# Patient Record
Sex: FEMALE | Race: WHITE | NOT HISPANIC OR LATINO | Employment: PART TIME | URBAN - METROPOLITAN AREA
[De-identification: names, ages, dates, MRNs, and addresses within clinical notes are randomized per-mention and may not be internally consistent; named-entity substitution may affect disease eponyms.]

---

## 2020-10-14 ENCOUNTER — HOSPITAL ENCOUNTER (OUTPATIENT)
Dept: RADIOLOGY | Facility: HOSPITAL | Age: 35
Discharge: HOME | End: 2020-10-14
Attending: FAMILY MEDICINE
Payer: COMMERCIAL

## 2020-10-14 ENCOUNTER — OFFICE VISIT (OUTPATIENT)
Dept: FAMILY MEDICINE | Facility: CLINIC | Age: 35
End: 2020-10-14
Payer: COMMERCIAL

## 2020-10-14 VITALS
HEART RATE: 71 BPM | HEIGHT: 61 IN | DIASTOLIC BLOOD PRESSURE: 83 MMHG | SYSTOLIC BLOOD PRESSURE: 120 MMHG | TEMPERATURE: 98 F | BODY MASS INDEX: 29.98 KG/M2 | WEIGHT: 158.81 LBS

## 2020-10-14 DIAGNOSIS — M25.562 CHRONIC PAIN OF LEFT KNEE: ICD-10-CM

## 2020-10-14 DIAGNOSIS — Z11.59 ENCOUNTER FOR SCREENING FOR OTHER VIRAL DISEASES: ICD-10-CM

## 2020-10-14 DIAGNOSIS — K21.00 GASTROESOPHAGEAL REFLUX DISEASE WITH ESOPHAGITIS WITHOUT HEMORRHAGE: ICD-10-CM

## 2020-10-14 DIAGNOSIS — G89.29 CHRONIC PAIN OF LEFT KNEE: ICD-10-CM

## 2020-10-14 DIAGNOSIS — Z13.6 ENCOUNTER FOR LIPID SCREENING FOR CARDIOVASCULAR DISEASE: ICD-10-CM

## 2020-10-14 DIAGNOSIS — T78.40XD ALLERGY, SUBSEQUENT ENCOUNTER: ICD-10-CM

## 2020-10-14 DIAGNOSIS — M79.605 LEFT LEG PAIN: ICD-10-CM

## 2020-10-14 DIAGNOSIS — Z82.61 FAMILY HISTORY OF RHEUMATOID ARTHRITIS: ICD-10-CM

## 2020-10-14 DIAGNOSIS — R05.3 CHRONIC COUGH: ICD-10-CM

## 2020-10-14 DIAGNOSIS — Z13.220 ENCOUNTER FOR LIPID SCREENING FOR CARDIOVASCULAR DISEASE: ICD-10-CM

## 2020-10-14 DIAGNOSIS — Z00.00 WELL ADULT EXAM: Primary | ICD-10-CM

## 2020-10-14 DIAGNOSIS — Z83.79 FAMILY HISTORY OF CELIAC DISEASE: ICD-10-CM

## 2020-10-14 DIAGNOSIS — Z76.89 REFERRAL OF PATIENT: ICD-10-CM

## 2020-10-14 DIAGNOSIS — Z79.899 ENCOUNTER FOR LONG-TERM (CURRENT) USE OF MEDICATIONS: ICD-10-CM

## 2020-10-14 DIAGNOSIS — M25.50 MULTIPLE JOINT PAIN: ICD-10-CM

## 2020-10-14 PROBLEM — T78.40XA ALLERGIES: Status: ACTIVE | Noted: 2020-10-14

## 2020-10-14 PROCEDURE — 99999 PR PBB SHADOW E&M-NEW PATIENT-LVL IV: CPT | Mod: PBBFAC,,, | Performed by: FAMILY MEDICINE

## 2020-10-14 PROCEDURE — 73590 X-RAY EXAM OF LOWER LEG: CPT | Mod: 26,LT,, | Performed by: RADIOLOGY

## 2020-10-14 PROCEDURE — 99213 OFFICE O/P EST LOW 20 MIN: CPT | Mod: 25,S$GLB,, | Performed by: FAMILY MEDICINE

## 2020-10-14 PROCEDURE — 3008F PR BODY MASS INDEX (BMI) DOCUMENTED: ICD-10-PCS | Mod: CPTII,S$GLB,, | Performed by: FAMILY MEDICINE

## 2020-10-14 PROCEDURE — 99213 PR OFFICE/OUTPT VISIT, EST, LEVL III, 20-29 MIN: ICD-10-PCS | Mod: 25,S$GLB,, | Performed by: FAMILY MEDICINE

## 2020-10-14 PROCEDURE — 3008F BODY MASS INDEX DOCD: CPT | Mod: CPTII,S$GLB,, | Performed by: FAMILY MEDICINE

## 2020-10-14 PROCEDURE — 73590 XR TIBIA FIBULA 2 VIEW LEFT: ICD-10-PCS | Mod: 26,LT,, | Performed by: RADIOLOGY

## 2020-10-14 PROCEDURE — 99385 PREV VISIT NEW AGE 18-39: CPT | Mod: S$GLB,,, | Performed by: FAMILY MEDICINE

## 2020-10-14 PROCEDURE — 73562 X-RAY EXAM OF KNEE 3: CPT | Mod: 26,LT,, | Performed by: RADIOLOGY

## 2020-10-14 PROCEDURE — 73560 X-RAY EXAM OF KNEE 1 OR 2: CPT | Mod: 26,59,RT, | Performed by: RADIOLOGY

## 2020-10-14 PROCEDURE — 73562 XR KNEE ORTHO LEFT: ICD-10-PCS | Mod: 26,LT,, | Performed by: RADIOLOGY

## 2020-10-14 PROCEDURE — 73560 XR KNEE ORTHO LEFT: ICD-10-PCS | Mod: 26,59,RT, | Performed by: RADIOLOGY

## 2020-10-14 PROCEDURE — 73560 X-RAY EXAM OF KNEE 1 OR 2: CPT | Mod: TC,PO,59,RT

## 2020-10-14 PROCEDURE — 73590 X-RAY EXAM OF LOWER LEG: CPT | Mod: TC,PO,LT

## 2020-10-14 PROCEDURE — 99385 PR PREVENTIVE VISIT,NEW,18-39: ICD-10-PCS | Mod: S$GLB,,, | Performed by: FAMILY MEDICINE

## 2020-10-14 PROCEDURE — 99999 PR PBB SHADOW E&M-NEW PATIENT-LVL IV: ICD-10-PCS | Mod: PBBFAC,,, | Performed by: FAMILY MEDICINE

## 2020-10-14 PROCEDURE — 73562 X-RAY EXAM OF KNEE 3: CPT | Mod: TC,PO,LT

## 2020-10-14 RX ORDER — MONTELUKAST SODIUM 10 MG/1
10 TABLET ORAL NIGHTLY
Qty: 30 TABLET | Refills: 11 | Status: SHIPPED | OUTPATIENT
Start: 2020-10-14 | End: 2020-11-13

## 2020-10-14 RX ORDER — ESOMEPRAZOLE MAGNESIUM 40 MG/1
40 CAPSULE, DELAYED RELEASE ORAL
Qty: 30 CAPSULE | Refills: 11 | Status: SHIPPED | OUTPATIENT
Start: 2020-10-14 | End: 2021-11-11

## 2020-10-14 NOTE — PROGRESS NOTES
======================================================  GOAL of current visit: ESTABLISH CARE    Previous PCP: none in 3 years  List all Specialists: none  Date of Last Annual Wellness: 3 years ago  Colonoscopy History: N/A  ======================================================  PLAN:      Problem List Items Addressed This Visit     Chronic pain of left knee     Reviewed x-ray.  Mild narrowing on x-rays seen.  Patient advised to try anti-inflammatory medication.  Follow-up if no improvement.  Knee brace as needed.         Relevant Orders    BRAYDON Screen w/Reflex    Sedimentation rate    C-reactive protein    Rheumatoid Factor    X-Ray Tibia Fibula 2 View Left (Completed)    Left leg pain     X-rays reviewed.  No abnormality seen on x-ray.  Patient does have soft tissue swelling in that area.  Will consider ultrasound if still causing issues.         Relevant Orders    BRAYDON Screen w/Reflex    Sedimentation rate    C-reactive protein    Rheumatoid Factor    X-ray Knee Ortho Left (Completed)    X-Ray Tibia Fibula 2 View Left (Completed)    Family history of rheumatoid arthritis     Check labs         Relevant Orders    BRAYDON Screen w/Reflex    Sedimentation rate    C-reactive protein    Rheumatoid Factor    Multiple joint pain    Relevant Orders    BRAYDON Screen w/Reflex    Sedimentation rate    C-reactive protein    Rheumatoid Factor    Chronic cough     Add Singulair.  Patient has been on antihistamines without relief.  Also treating GERD.  See problem.    Check COVID antibodies.         Relevant Medications    esomeprazole (NEXIUM) 40 MG capsule    Allergies     Continue antihistamines.  At Singulair.  Avoid triggers.         Relevant Medications    montelukast (SINGULAIR) 10 mg tablet    Family history of celiac disease     Check labs.         Relevant Orders    Tissue Transglutaminase, IgA    Gastroesophageal reflux disease with esophagitis without hemorrhage     Increase PPI to prescription strength.GERD  RECOMMENDATIONS  caffeine reduction dietary changes elevate HOB NPO after supper  - Take PPI in the morning 30-60 minutes before breakfast  - Educated patient on lifestyle modifications to help control/reduce reflux/abdominal pain including: avoid large meals, avoid eating within 2-3 hours of bedtime (avoid late night eating & lying down soon after eating), elevate head of bed if nocturnal symptoms are present, smoking cessation (if current smoker), & weight loss (if overweight).   - Educated to avoid known foods which trigger reflux symptoms & to minimize/avoid high-fat foods, chocolate, caffeine, citrus, alcohol, & tomato products.  - Advised to avoid/limit use of NSAID's, since they can cause GI upset, bleeding, and/or ulcers. If needed, take with food.            Relevant Medications    esomeprazole (NEXIUM) 40 MG capsule      Other Visit Diagnoses     Well adult exam    -  Primary    Relevant Orders    Lipid Panel    Hemoglobin A1C    Comprehensive Metabolic Panel    TSH    Encounter for long-term (current) use of medications        Relevant Orders    Lipid Panel    Hemoglobin A1C    Comprehensive Metabolic Panel    TSH    CBC Without Differential    Encounter for lipid screening for cardiovascular disease        Relevant Orders    Lipid Panel    Referral of patient        Relevant Orders    Ambulatory referral/consult to Obstetrics / Gynecology    Encounter for screening for other viral diseases        Relevant Orders    COVID-19 (SARS CoV-2) IgG Antibody        Future Appointments     Date Provider Specialty Appt Notes    11/25/2020 Alexis Guadalupe MD Family Medicine     10/14/2021 Alexis Guadalupe MD Family Medicine Annual Wellness           Medication List with Changes/Refills   New Medications    ESOMEPRAZOLE (NEXIUM) 40 MG CAPSULE    Take 1 capsule (40 mg total) by mouth before breakfast.    MONTELUKAST (SINGULAIR) 10 MG TABLET    Take 1 tablet (10 mg total) by mouth every evening.   Discontinued  Medications    ESOMEPRAZOLE MAGNESIUM (NEXIUM 24HR ORAL)    Take 1 tablet by mouth once daily.        Alexis Guadalupe M.D.     ==========================================================================  Subjective:      Patient ID: Sumi Niño is a 35 y.o. female.  has a past medical history of GERD (gastroesophageal reflux disease) and IBS (irritable bowel syndrome).     Chief Complaint: Annual Exam      Problem List Items Addressed This Visit     Chronic pain of left knee    Overview     Chronic.  Patient has had history of traumatic falls.  Patient is not been evaluated with x-ray or by medical facility previously.  No recent trauma or fall.  Patient reports that the left knee will occasionally lock and call sharp pain.  X-Ray Tibia Fibula 2 View Left  Narrative: EXAMINATION:  XR TIBIA FIBULA 2 VIEW LEFT    CLINICAL HISTORY:  Pain in left knee    TECHNIQUE:  AP and lateral views of the left tibia and fibula were performed.    COMPARISON:  None.    FINDINGS:  No acute osseous abnormality.  Soft tissues appear normal.  Impression: As above    Electronically signed by: Kamlesh Christie MD  Date:    10/14/2020  Time:    12:44  X-ray Knee Ortho Left  Narrative: EXAMINATION:  XR KNEE ORTHO LEFT    CLINICAL HISTORY:  Pain in left leg    TECHNIQUE:  AP standing of both knees, Merchant views of both knees as well as a lateral view of the left knee were performed.    COMPARISON:  None    FINDINGS:  No effusion.  No patellar tilt.  No fracture or dislocation.  Minimal medial compartment narrowing on either side.  Impression: As above    Electronically signed by: Kamlesh Christie MD  Date:    10/14/2020  Time:    12:44             Current Assessment & Plan     Reviewed x-ray.  Mild narrowing on x-rays seen.  Patient advised to try anti-inflammatory medication.  Follow-up if no improvement.  Knee brace as needed.         Left leg pain    Overview     Chronic.  Patient reports that she had a fall where she landed on a piece of  furniture and hit her tibia on in table.  Patient did have a open wound at that time with this was over two years ago.  She is still having pain and swelling in that area.         Current Assessment & Plan     X-rays reviewed.  No abnormality seen on x-ray.  Patient does have soft tissue swelling in that area.  Will consider ultrasound if still causing issues.         Family history of rheumatoid arthritis    Overview     Strong family history of autoimmune         Current Assessment & Plan     Check labs         Multiple joint pain    Chronic cough    Overview     Started earlier this year after viral illness.  Patient wonders if it was COVID.  Patient also has uncontrolled reflux on over-the-counter Nexium.         Current Assessment & Plan     Add Singulair.  Patient has been on antihistamines without relief.  Also treating GERD.  See problem.    Check COVID antibodies.         Allergies    Overview     Chronic.  Intermittent control.  Patient has tried antihistamines without relief.  Patient reports allergy symptoms with change in weather and seasons.         Current Assessment & Plan     Continue antihistamines.  At Singulair.  Avoid triggers.         Family history of celiac disease    Overview     Patient reports history of irritable bowel syndrome in herself.  Her mother has celiac disease.  She has never had a colonoscopy.         Current Assessment & Plan     Check labs.         Gastroesophageal reflux disease with esophagitis without hemorrhage    Overview     Chronic.  Uncontrolled.  Patient reports that she has a worsening cough when she lies flat.  She is on Nexium 20 mg without relief.         Current Assessment & Plan     Increase PPI to prescription strength.GERD RECOMMENDATIONS  caffeine reduction dietary changes elevate HOB NPO after supper  - Take PPI in the morning 30-60 minutes before breakfast  - Educated patient on lifestyle modifications to help control/reduce reflux/abdominal pain  including: avoid large meals, avoid eating within 2-3 hours of bedtime (avoid late night eating & lying down soon after eating), elevate head of bed if nocturnal symptoms are present, smoking cessation (if current smoker), & weight loss (if overweight).   - Educated to avoid known foods which trigger reflux symptoms & to minimize/avoid high-fat foods, chocolate, caffeine, citrus, alcohol, & tomato products.  - Advised to avoid/limit use of NSAID's, since they can cause GI upset, bleeding, and/or ulcers. If needed, take with food.              Other Visit Diagnoses     Well adult exam    -  Primary    Encounter for long-term (current) use of medications        Encounter for lipid screening for cardiovascular disease        Referral of patient        Encounter for screening for other viral diseases               Past Medical History:  Past Medical History:   Diagnosis Date    GERD (gastroesophageal reflux disease)     IBS (irritable bowel syndrome)      History reviewed. No pertinent surgical history.  Review of patient's allergies indicates:   Allergen Reactions    Amoxicillin Hives, Itching, Other (See Comments), Rash and Swelling    Cephalexin Hives, Itching, Other (See Comments), Rash and Swelling    Sulfamethoxazole-trimethoprim Hives, Itching, Other (See Comments), Rash and Swelling    Codeine Hallucinations, Hives, Itching and Nausea And Vomiting     Medication List with Changes/Refills   New Medications    ESOMEPRAZOLE (NEXIUM) 40 MG CAPSULE    Take 1 capsule (40 mg total) by mouth before breakfast.    MONTELUKAST (SINGULAIR) 10 MG TABLET    Take 1 tablet (10 mg total) by mouth every evening.   Discontinued Medications    ESOMEPRAZOLE MAGNESIUM (NEXIUM 24HR ORAL)    Take 1 tablet by mouth once daily.       Social History     Tobacco Use    Smoking status: Never Smoker    Smokeless tobacco: Never Used   Substance Use Topics    Alcohol use: Yes     Alcohol/week: 3.0 standard drinks     Types: 3 Glasses  "of wine per week      Family History   Problem Relation Age of Onset    Arthritis Mother     COPD Mother     Cancer Maternal Grandfather         Melanoma and lung    Diabetes Paternal Aunt     Heart disease Father          from heart attack age 42    Hyperlipidemia Father     Hyperlipidemia Brother     Learning disabilities Brother         ADHD       I have reviewed the complete PMH, social history, surgical history, allergies and medications.  As well as family history.    Review of Systems   Constitutional: Negative for activity change and fatigue.   HENT: Negative for congestion and sinus pain.    Eyes: Negative for visual disturbance.   Respiratory: Negative for chest tightness and shortness of breath.    Cardiovascular: Negative for palpitations and leg swelling.   Gastrointestinal: Negative for abdominal pain, diarrhea and nausea.   Endocrine: Negative for polyuria.   Genitourinary: Negative for difficulty urinating and frequency.   Musculoskeletal: Positive for arthralgias and joint swelling.   Skin: Negative for rash.   Neurological: Positive for dizziness. Negative for headaches.   Psychiatric/Behavioral: Negative for agitation. The patient is not nervous/anxious.      Objective:   /83   Pulse 71   Temp 98.1 °F (36.7 °C) (Temporal)   Ht 5' 1" (1.549 m)   Wt 72 kg (158 lb 12.8 oz)   LMP 2020   BMI 30.00 kg/m²   Physical Exam  Vitals signs and nursing note reviewed.   Constitutional:       General: She is not in acute distress.     Appearance: She is well-developed.   HENT:      Head: Normocephalic and atraumatic.   Eyes:      Pupils: Pupils are equal, round, and reactive to light.   Neck:      Musculoskeletal: Normal range of motion and neck supple.   Cardiovascular:      Rate and Rhythm: Normal rate and regular rhythm.      Heart sounds: Normal heart sounds. No murmur.   Pulmonary:      Effort: Pulmonary effort is normal. No respiratory distress.      Breath sounds: Normal " breath sounds. No wheezing.   Musculoskeletal:      Left knee: She exhibits deformity and bony tenderness. She exhibits normal range of motion, no swelling, no effusion, no ecchymosis, no laceration and no erythema. Tenderness found. Medial joint line tenderness noted.        Legs:       Comments: Soft tissue swelling and tenderness to palpation.  No bruising, no change in skin color.  Fluctuant but not indurated.   Skin:     General: Skin is warm and dry.      Capillary Refill: Capillary refill takes less than 2 seconds.   Neurological:      Mental Status: She is alert and oriented to person, place, and time.      Cranial Nerves: No cranial nerve deficit.   Psychiatric:         Behavior: Behavior normal.         Assessment:     1. Well adult exam    2. Encounter for long-term (current) use of medications    3. Encounter for lipid screening for cardiovascular disease    4. Chronic pain of left knee    5. Left leg pain    6. Family history of rheumatoid arthritis    7. Multiple joint pain    8. Referral of patient    9. Chronic cough    10. Encounter for screening for other viral diseases    11. Allergy, subsequent encounter    12. Gastroesophageal reflux disease with esophagitis without hemorrhage    13. Family history of celiac disease      MDM:   Moderate complexity.  Moderate risk.  Patient also here for annual wellness visit.  See other note for annual wellness.  I have Reviewed and summarized old records.  I have performed thorough medication reconciliation today and discussed risk and benefits of each medication.  I have reviewed imaging, labs and discussed with patient.  All questions were answered.  I am requesting old records and will review them once they are available.  Previous provider    I have signed for the following orders AND/OR meds.  Orders Placed This Encounter   Procedures    X-ray Knee Ortho Left     Standing Status:   Future     Number of Occurrences:   1     Standing Expiration Date:    10/14/2021    X-Ray Tibia Fibula 2 View Left     Standing Status:   Future     Number of Occurrences:   1     Standing Expiration Date:   10/14/2021     Order Specific Question:   May the Radiologist modify the order per protocol to meet the clinical needs of the patient?     Answer:   Yes    Lipid Panel     Standing Status:   Standing     Number of Occurrences:   99     Standing Expiration Date:   12/14/2021    Hemoglobin A1C     Standing Status:   Standing     Number of Occurrences:   99     Standing Expiration Date:   12/14/2021    Comprehensive Metabolic Panel     Standing Status:   Standing     Number of Occurrences:   99     Standing Expiration Date:   12/13/2021    TSH     Standing Status:   Standing     Number of Occurrences:   99     Standing Expiration Date:   12/14/2021    CBC Without Differential     Standing Status:   Future     Standing Expiration Date:   12/13/2021    COVID-19 (SARS CoV-2) IgG Antibody     Standing Status:   Future     Standing Expiration Date:   12/13/2021     Order Specific Question:   Diagnosis:     Answer:   Encounter for antibody response examination [[id number]]    BRAYDON Screen w/Reflex     Standing Status:   Future     Standing Expiration Date:   12/13/2021    Sedimentation rate     Standing Status:   Future     Standing Expiration Date:   12/13/2021    C-reactive protein     Standing Status:   Future     Standing Expiration Date:   12/13/2021    Rheumatoid Factor     Standing Status:   Future     Standing Expiration Date:   12/13/2021    Tissue Transglutaminase, IgA     Standing Status:   Future     Standing Expiration Date:   10/14/2021    Ambulatory referral/consult to Obstetrics / Gynecology     Standing Status:   Future     Standing Expiration Date:   11/14/2021     Referral Priority:   Routine     Referral Type:   Consultation     Referral Reason:   Specialty Services Required     Referred to Provider:   Britney Fox MD     Requested Specialty:    Obstetrics and Gynecology     Number of Visits Requested:   1     Medications Ordered This Encounter   Medications    esomeprazole (NEXIUM) 40 MG capsule     Sig: Take 1 capsule (40 mg total) by mouth before breakfast.     Dispense:  30 capsule     Refill:  11    montelukast (SINGULAIR) 10 mg tablet     Sig: Take 1 tablet (10 mg total) by mouth every evening.     Dispense:  30 tablet     Refill:  11        Follow up in about 1 year (around 10/14/2021), or if symptoms worsen or fail to improve, for Annual Wellness Exam.    If no improvement in symptoms or symptoms worsen, advised to call/follow-up at clinic or go to ER. Patient voiced understanding and all questions/concerns were addressed.     DISCLAIMER: This note was compiled by using a speech recognition dictation system and therefore please be aware that typographical / speech recognition errors can and do occur.  Please contact me if you see any errors specifically.    Alexis Guadalupe M.D.       Office: [phone]   [address] [place] [address]  FAX: [phone]

## 2020-10-14 NOTE — ASSESSMENT & PLAN NOTE
Add Singulair.  Patient has been on antihistamines without relief.  Also treating GERD.  See problem.    Check COVID antibodies.

## 2020-10-14 NOTE — PROGRESS NOTES
Please CALL patient with results and Document verification.   [phone]  Minimal joint space narrowing.  Otherwise normal knee x-ray.

## 2020-10-14 NOTE — PROGRESS NOTES
This note is specifically for wellness visit performed today.     WELLNESS EXAM      Patient ID: Sumi Niño is a 35 y.o. female with  has a past medical history of GERD (gastroesophageal reflux disease) and IBS (irritable bowel syndrome).     Chief Complaint:  Encounter for wellness exam    Well Adult Physical: Patient here for a comprehensive physical exam.The patient reports chronic problems.  See other note for details.  Do you take any herbs or supplements that were not prescribed by a doctor? no   Are you taking calcium supplements? no   Are you taking aspirin daily? no     History:  None reported  OBGYN:  Requesting record    LMP: Patient's last menstrual period was 09/30/2020.   MMG:  none  PAP: due  Colonoscopy: none    The patient has no Health Maintenance topics of status Not Due     ==============================================  History reviewed.   Health Maintenance Due   Topic Date Due    Lipid Panel  1984    TETANUS VACCINE  07/29/2002    Cervical Cancer Screening  07/29/2005       Past Medical History:  Past Medical History:   Diagnosis Date    GERD (gastroesophageal reflux disease)     IBS (irritable bowel syndrome)      History reviewed. No pertinent surgical history.  Review of patient's allergies indicates:   Allergen Reactions    Amoxicillin Hives, Itching, Other (See Comments), Rash and Swelling    Cephalexin Hives, Itching, Other (See Comments), Rash and Swelling    Sulfamethoxazole-trimethoprim Hives, Itching, Other (See Comments), Rash and Swelling    Codeine Hallucinations, Hives, Itching and Nausea And Vomiting     Current Outpatient Medications on File Prior to Visit   Medication Sig Dispense Refill    [DISCONTINUED] esomeprazole magnesium (NEXIUM 24HR ORAL) Take 1 tablet by mouth once daily.        No current facility-administered medications on file prior to visit.      Social History     Socioeconomic History    Marital status: Single     Spouse name: Not on file     Number of children: Not on file    Years of education: Not on file    Highest education level: Not on file   Occupational History    Not on file   Social Needs    Financial resource strain: Not on file    Food insecurity     Worry: Not on file     Inability: Not on file    Transportation needs     Medical: Not on file     Non-medical: Not on file   Tobacco Use    Smoking status: Never Smoker    Smokeless tobacco: Never Used   Substance and Sexual Activity    Alcohol use: Yes     Alcohol/week: 3.0 standard drinks     Types: 3 Glasses of wine per week    Drug use: Never    Sexual activity: Yes     Partners: Male     Birth control/protection: Coitus interruptus, Partner-Vasectomy   Lifestyle    Physical activity     Days per week: Not on file     Minutes per session: Not on file    Stress: Only a little   Relationships    Social connections     Talks on phone: Not on file     Gets together: Not on file     Attends [employer] service: Not on file     Active member of club or organization: Not on file     Attends meetings of clubs or organizations: Not on file     Relationship status: Not on file   Other Topics Concern    Not on file   Social History Narrative    Not on file     Family History   Problem Relation Age of Onset    Arthritis Mother     COPD Mother     Cancer Maternal Grandfather         Melanoma and lung    Diabetes Paternal Aunt     Heart disease Father          from heart attack age 42    Hyperlipidemia Father     Hyperlipidemia Brother     Learning disabilities Brother         ADHD       Review of Systems   See other note for full review of systems.  Otherwise negative.  Objective:    Nursing note and vitals reviewed.  Vitals:    10/14/20 1125   BP: 120/83   Pulse: 71   Temp: 98.1 °F (36.7 °C)     Body mass index is 30 kg/m².     Physical Exam  Vitals signs and nursing note reviewed.   Constitutional:       General: She is not in acute distress.     Appearance: She is  well-developed.   HENT:      Head: Normocephalic and atraumatic.   Eyes:      Pupils: Pupils are equal, round, and reactive to light.   Neck:      Musculoskeletal: Normal range of motion and neck supple.   Cardiovascular:      Rate and Rhythm: Normal rate and regular rhythm.      Heart sounds: Normal heart sounds. No murmur.   Pulmonary:      Effort: Pulmonary effort is normal. No respiratory distress.      Breath sounds: Normal breath sounds. No wheezing.   Musculoskeletal: Normal range of motion.   Skin:     General: Skin is warm and dry.      Capillary Refill: Capillary refill takes less than 2 seconds.   Neurological:      Mental Status: She is alert and oriented to person, place, and time.      Cranial Nerves: No cranial nerve deficit.   Psychiatric:         Behavior: Behavior normal.      See other note for abnormalities.            Assessment / Plan:      1.  ANNUAL WELLNESS EXAM -patient here for annual wellness exam.  Labs ordered.  Health maintenance was reviewed and ordered.  Medications were reviewed and reconciled.    Complete history and physical was completed today.  Complete and thorough medication reconciliation was performed.  Discussed risks and benefits of medications.  Advised patient on orders and health maintenance.  We discussed old records and old labs if available.  Will request any records not available through epic.  Continue current medications listed on your summary sheet.    All questions were answered. Patient had no further concerns. Advised of Wellness plan. Follow up in 1 year for ANNUAL WELLNESS EXAM    Orders Placed This Encounter   Procedures    X-ray Knee Ortho Left     Standing Status:   Future     Number of Occurrences:   1     Standing Expiration Date:   10/14/2021    X-Ray Tibia Fibula 2 View Left     Standing Status:   Future     Number of Occurrences:   1     Standing Expiration Date:   10/14/2021     Order Specific Question:   May the Radiologist modify the order per  protocol to meet the clinical needs of the patient?     Answer:   Yes    Lipid Panel     Standing Status:   Standing     Number of Occurrences:   99     Standing Expiration Date:   12/14/2021    Hemoglobin A1C     Standing Status:   Standing     Number of Occurrences:   99     Standing Expiration Date:   12/14/2021    Comprehensive Metabolic Panel     Standing Status:   Standing     Number of Occurrences:   99     Standing Expiration Date:   12/13/2021    TSH     Standing Status:   Standing     Number of Occurrences:   99     Standing Expiration Date:   12/14/2021    CBC Without Differential     Standing Status:   Future     Standing Expiration Date:   12/13/2021    COVID-19 (SARS CoV-2) IgG Antibody     Standing Status:   Future     Standing Expiration Date:   12/13/2021     Order Specific Question:   Diagnosis:     Answer:   Encounter for antibody response examination [[id number]]    BRAYDON Screen w/Reflex     Standing Status:   Future     Standing Expiration Date:   12/13/2021    Sedimentation rate     Standing Status:   Future     Standing Expiration Date:   12/13/2021    C-reactive protein     Standing Status:   Future     Standing Expiration Date:   12/13/2021    Rheumatoid Factor     Standing Status:   Future     Standing Expiration Date:   12/13/2021    Tissue Transglutaminase, IgA     Standing Status:   Future     Standing Expiration Date:   10/14/2021    Ambulatory referral/consult to Obstetrics / Gynecology     Standing Status:   Future     Standing Expiration Date:   11/14/2021     Referral Priority:   Routine     Referral Type:   Consultation     Referral Reason:   Specialty Services Required     Referred to Provider:   Britney Fox MD     Requested Specialty:   Obstetrics and Gynecology     Number of Visits Requested:   1       Medications Ordered This Encounter   Medications    esomeprazole (NEXIUM) 40 MG capsule     Sig: Take 1 capsule (40 mg total) by mouth before breakfast.      Dispense:  30 capsule     Refill:  11    montelukast (SINGULAIR) 10 mg tablet     Sig: Take 1 tablet (10 mg total) by mouth every evening.     Dispense:  30 tablet     Refill:  11          Alexis Guadalupe MD

## 2020-10-14 NOTE — ASSESSMENT & PLAN NOTE
X-rays reviewed.  No abnormality seen on x-ray.  Patient does have soft tissue swelling in that area.  Will consider ultrasound if still causing issues.

## 2020-10-14 NOTE — PATIENT INSTRUCTIONS
Follow up in about 1 year (around 10/14/2021), or if symptoms worsen or fail to improve, for Annual Wellness Exam.     If no improvement in symptoms or symptoms worsen, please be advised to call MD, follow-up at clinic and/or go to ER if becomes severe.    Alexis Guadalupe M.D.        We Offer TELEHEALTH & Same Day Appointments!   Book your Telehealth appointment with me through my nurse or   Clinic appointments on [employer]!    [address] [place] [address]    Office: [phone]   FAX: [phone]    Check out my Facebook Page and Follow Me at: [email or web address]    Check out my website at [employer] by clicking on: [email or web address]    To Schedule appointments online, go to [email or web address]: [email or web address]

## 2020-10-14 NOTE — ASSESSMENT & PLAN NOTE
Increase PPI to prescription strength.GERD RECOMMENDATIONS  caffeine reduction dietary changes elevate HOB NPO after supper  - Take PPI in the morning 30-60 minutes before breakfast  - Educated patient on lifestyle modifications to help control/reduce reflux/abdominal pain including: avoid large meals, avoid eating within 2-3 hours of bedtime (avoid late night eating & lying down soon after eating), elevate head of bed if nocturnal symptoms are present, smoking cessation (if current smoker), & weight loss (if overweight).   - Educated to avoid known foods which trigger reflux symptoms & to minimize/avoid high-fat foods, chocolate, caffeine, citrus, alcohol, & tomato products.  - Advised to avoid/limit use of NSAID's, since they can cause GI upset, bleeding, and/or ulcers. If needed, take with food.

## 2020-10-14 NOTE — PROGRESS NOTES
Please CALL patient with results and Document verification.   [phone]    Normal x-ray of the left lower leg.

## 2020-10-14 NOTE — ASSESSMENT & PLAN NOTE
Reviewed x-ray.  Mild narrowing on x-rays seen.  Patient advised to try anti-inflammatory medication.  Follow-up if no improvement.  Knee brace as needed.

## 2020-11-20 ENCOUNTER — LAB VISIT (OUTPATIENT)
Dept: LAB | Facility: HOSPITAL | Age: 35
End: 2020-11-20
Attending: FAMILY MEDICINE
Payer: COMMERCIAL

## 2020-11-20 DIAGNOSIS — Z83.79 FAMILY HISTORY OF CELIAC DISEASE: ICD-10-CM

## 2020-11-20 DIAGNOSIS — M25.50 MULTIPLE JOINT PAIN: ICD-10-CM

## 2020-11-20 DIAGNOSIS — G89.29 CHRONIC PAIN OF LEFT KNEE: ICD-10-CM

## 2020-11-20 DIAGNOSIS — M25.562 CHRONIC PAIN OF LEFT KNEE: ICD-10-CM

## 2020-11-20 DIAGNOSIS — M79.605 LEFT LEG PAIN: ICD-10-CM

## 2020-11-20 DIAGNOSIS — Z11.59 ENCOUNTER FOR SCREENING FOR OTHER VIRAL DISEASES: ICD-10-CM

## 2020-11-20 DIAGNOSIS — Z82.61 FAMILY HISTORY OF RHEUMATOID ARTHRITIS: ICD-10-CM

## 2020-11-20 DIAGNOSIS — Z00.00 WELL ADULT EXAM: ICD-10-CM

## 2020-11-20 DIAGNOSIS — Z79.899 ENCOUNTER FOR LONG-TERM (CURRENT) USE OF MEDICATIONS: ICD-10-CM

## 2020-11-20 DIAGNOSIS — Z13.6 ENCOUNTER FOR LIPID SCREENING FOR CARDIOVASCULAR DISEASE: ICD-10-CM

## 2020-11-20 DIAGNOSIS — Z13.220 ENCOUNTER FOR LIPID SCREENING FOR CARDIOVASCULAR DISEASE: ICD-10-CM

## 2020-11-20 LAB
ERYTHROCYTE [DISTWIDTH] IN BLOOD BY AUTOMATED COUNT: 11.9 % (ref 11.5–14.5)
ERYTHROCYTE [SEDIMENTATION RATE] IN BLOOD BY WESTERGREN METHOD: 4 MM/HR (ref 0–20)
HCT VFR BLD AUTO: 44.5 % (ref 37–48.5)
HGB BLD-MCNC: 13.8 G/DL (ref 12–16)
MCH RBC QN AUTO: 32.7 PG (ref 27–31)
MCHC RBC AUTO-ENTMCNC: 31 G/DL (ref 32–36)
MCV RBC AUTO: 106 FL (ref 82–98)
PLATELET # BLD AUTO: 250 K/UL (ref 150–350)
PMV BLD AUTO: 11.4 FL (ref 9.2–12.9)
RBC # BLD AUTO: 4.22 M/UL (ref 4–5.4)
TSH SERPL DL<=0.005 MIU/L-ACNC: 2.25 UIU/ML (ref 0.4–4)
WBC # BLD AUTO: 9.32 K/UL (ref 3.9–12.7)

## 2020-11-20 PROCEDURE — 36415 COLL VENOUS BLD VENIPUNCTURE: CPT | Mod: PO

## 2020-11-20 PROCEDURE — 86038 ANTINUCLEAR ANTIBODIES: CPT

## 2020-11-20 PROCEDURE — 86039 ANTINUCLEAR ANTIBODIES (ANA): CPT

## 2020-11-20 PROCEDURE — 83036 HEMOGLOBIN GLYCOSYLATED A1C: CPT

## 2020-11-20 PROCEDURE — 85027 COMPLETE CBC AUTOMATED: CPT

## 2020-11-20 PROCEDURE — 86140 C-REACTIVE PROTEIN: CPT

## 2020-11-20 PROCEDURE — 80061 LIPID PANEL: CPT

## 2020-11-20 PROCEDURE — 83516 IMMUNOASSAY NONANTIBODY: CPT

## 2020-11-20 PROCEDURE — 85651 RBC SED RATE NONAUTOMATED: CPT | Mod: PO

## 2020-11-20 PROCEDURE — 80053 COMPREHEN METABOLIC PANEL: CPT

## 2020-11-20 PROCEDURE — 86769 SARS-COV-2 COVID-19 ANTIBODY: CPT

## 2020-11-20 PROCEDURE — 84443 ASSAY THYROID STIM HORMONE: CPT

## 2020-11-20 PROCEDURE — 86431 RHEUMATOID FACTOR QUANT: CPT

## 2020-11-20 PROCEDURE — 86235 NUCLEAR ANTIGEN ANTIBODY: CPT | Mod: 59

## 2020-11-21 LAB
ALBUMIN SERPL BCP-MCNC: 4.3 G/DL (ref 3.5–5.2)
ALP SERPL-CCNC: 55 U/L (ref 55–135)
ALT SERPL W/O P-5'-P-CCNC: 54 U/L (ref 10–44)
ANION GAP SERPL CALC-SCNC: 17 MMOL/L (ref 8–16)
AST SERPL-CCNC: 40 U/L (ref 10–40)
BILIRUB SERPL-MCNC: 0.6 MG/DL (ref 0.1–1)
BUN SERPL-MCNC: 8 MG/DL (ref 6–20)
CALCIUM SERPL-MCNC: 9 MG/DL (ref 8.7–10.5)
CHLORIDE SERPL-SCNC: 101 MMOL/L (ref 95–110)
CHOLEST SERPL-MCNC: 324 MG/DL (ref 120–199)
CHOLEST/HDLC SERPL: 3.3 {RATIO} (ref 2–5)
CO2 SERPL-SCNC: 19 MMOL/L (ref 23–29)
CREAT SERPL-MCNC: 0.7 MG/DL (ref 0.5–1.4)
CRP SERPL-MCNC: 8.4 MG/L (ref 0–8.2)
EST. GFR  (AFRICAN AMERICAN): >60 ML/MIN/1.73 M^2
EST. GFR  (NON AFRICAN AMERICAN): >60 ML/MIN/1.73 M^2
ESTIMATED AVG GLUCOSE: 100 MG/DL (ref 68–131)
GLUCOSE SERPL-MCNC: 78 MG/DL (ref 70–110)
HBA1C MFR BLD HPLC: 5.1 % (ref 4–5.6)
HDLC SERPL-MCNC: 97 MG/DL (ref 40–75)
HDLC SERPL: 29.9 % (ref 20–50)
LDLC SERPL CALC-MCNC: 205.4 MG/DL (ref 63–159)
NONHDLC SERPL-MCNC: 227 MG/DL
POTASSIUM SERPL-SCNC: 3.4 MMOL/L (ref 3.5–5.1)
PROT SERPL-MCNC: 7.7 G/DL (ref 6–8.4)
SARS-COV-2 IGG SERPLBLD QL IA.RAPID: NEGATIVE
SODIUM SERPL-SCNC: 137 MMOL/L (ref 136–145)
TRIGL SERPL-MCNC: 108 MG/DL (ref 30–150)

## 2020-11-21 NOTE — PROGRESS NOTES
#CALL THE PATIENT# to discuss results/see if they have questions and document verification. Make FU appt if needed.    #Pend me the orders in my interpretation below.#    I have sent a message to them with the interpretation (see below).  See if they have any questions and make a follow-up appointment if not already schedule and if needed.    Also please address any outstanding health maintenance that may be due: TETANUS VACCINE due on 07/29/2002  Cervical Cancer Screening due on 07/29/2005  ====================================    My interpretation that was sent to them through [employer]:    Sumi, I have reviewed your recent blood work.     COVID ANTIBODY TESTING IS NEGATIVE.  THEREFORE THERE HAS NOT BEEN AN IMMUNE RESPONSE TO COVID.  Your complete blood count is WITHIN NORMAL LIMITS.  NO ANEMIA.  MCV LEVEL IS ELEVATED WHICH COULD BE DUE TO B12 OR FOLATE DEFICIENCY.  PLEASE FOLLOW-UP SO WE CAN FURTHER EVALUATE.  Your metabolic panel which shows your glucose, kidney function, electrolytes, and liver function is ABNORMAL WITH MILD LOW-POTASSIUM.  ELEVATED LIVER ENZYME.  OTHERWISE NORMAL KIDNEY FUNCTION AND ELECTROLYTES.  Thyroid studies are NORMAL.   Your cholesterol is ABNORMAL.  I RECOMMEND LIFESTYLE MODIFICATION WITH LOW-FAT HIGH-FIBER DIET AND INCREASE IN EXERCISE.  RECHECK LIPID PANEL IN THREE MONTHS.  IF NO IMPROVEMENT WILL NEED TO START ON MEDICATION.  Your INFLAMMATORY MARKERS SHOWS MILD INFLAMMATION  Your hemoglobin A1c is NORMAL.  THEREFORE YOU DO NOT HAVE DIABETES.  This test is gold standard screening test for diabetes.  It is a measures 3 months of your average blood sugar.

## 2020-11-23 LAB
ANA PATTERN 1: NORMAL
ANA PATTERN 2: NORMAL
ANA SER QL IF: POSITIVE
ANA TITER 2: NORMAL
ANA TITR SER IF: NORMAL {TITER}
RHEUMATOID FACT SERPL-ACNC: <10 IU/ML (ref 0–15)

## 2020-11-24 DIAGNOSIS — R76.8 POSITIVE ANA (ANTINUCLEAR ANTIBODY): Primary | ICD-10-CM

## 2020-11-24 NOTE — PROGRESS NOTES
Please CALL patient with results and Document verification.   [phone]  BRAYDON screening test for autoimmune is positive.  Further testing is pending.    I am going to refer you to Rheumatology for further evaluation and treatment.    Set up referral.  Notify patient.

## 2020-11-25 ENCOUNTER — OFFICE VISIT (OUTPATIENT)
Dept: FAMILY MEDICINE | Facility: CLINIC | Age: 35
End: 2020-11-25
Payer: COMMERCIAL

## 2020-11-25 VITALS — WEIGHT: 155 LBS | BODY MASS INDEX: 29.29 KG/M2

## 2020-11-25 DIAGNOSIS — E78.5 HYPERLIPIDEMIA, UNSPECIFIED HYPERLIPIDEMIA TYPE: ICD-10-CM

## 2020-11-25 DIAGNOSIS — Z82.61 FAMILY HISTORY OF RHEUMATOID ARTHRITIS: ICD-10-CM

## 2020-11-25 DIAGNOSIS — R74.8 ELEVATED LIVER ENZYMES: ICD-10-CM

## 2020-11-25 DIAGNOSIS — M25.50 MULTIPLE JOINT PAIN: ICD-10-CM

## 2020-11-25 DIAGNOSIS — E56.9 VITAMIN DEFICIENCY: ICD-10-CM

## 2020-11-25 DIAGNOSIS — M79.605 LEFT LEG PAIN: ICD-10-CM

## 2020-11-25 DIAGNOSIS — M25.562 CHRONIC PAIN OF LEFT KNEE: ICD-10-CM

## 2020-11-25 DIAGNOSIS — E87.6 HYPOKALEMIA: ICD-10-CM

## 2020-11-25 DIAGNOSIS — R76.8 POSITIVE ANA (ANTINUCLEAR ANTIBODY): Primary | ICD-10-CM

## 2020-11-25 DIAGNOSIS — G89.29 CHRONIC PAIN OF LEFT KNEE: ICD-10-CM

## 2020-11-25 DIAGNOSIS — R71.8 ELEVATED MCV: ICD-10-CM

## 2020-11-25 LAB
ANTI SM ANTIBODY: 0.06 RATIO (ref 0–0.99)
ANTI SM/RNP ANTIBODY: 0.06 RATIO (ref 0–0.99)
ANTI-SM INTERPRETATION: NEGATIVE
ANTI-SM/RNP INTERPRETATION: NEGATIVE
ANTI-SSA ANTIBODY: 0.05 RATIO (ref 0–0.99)
ANTI-SSA INTERPRETATION: NEGATIVE
ANTI-SSB ANTIBODY: 0.06 RATIO (ref 0–0.99)
ANTI-SSB INTERPRETATION: NEGATIVE
DSDNA AB SER-ACNC: NORMAL [IU]/ML

## 2020-11-25 PROCEDURE — 99214 OFFICE O/P EST MOD 30 MIN: CPT | Mod: 95,,, | Performed by: FAMILY MEDICINE

## 2020-11-25 PROCEDURE — 99214 PR OFFICE/OUTPT VISIT, EST, LEVL IV, 30-39 MIN: ICD-10-PCS | Mod: 95,,, | Performed by: FAMILY MEDICINE

## 2020-11-25 PROCEDURE — 3008F BODY MASS INDEX DOCD: CPT | Mod: CPTII,,, | Performed by: FAMILY MEDICINE

## 2020-11-25 PROCEDURE — 3008F PR BODY MASS INDEX (BMI) DOCUMENTED: ICD-10-PCS | Mod: CPTII,,, | Performed by: FAMILY MEDICINE

## 2020-11-25 RX ORDER — MULTIVIT WITH IRON,MINERALS
100 TABLET ORAL NIGHTLY
Refills: 0 | COMMUNITY
Start: 2020-11-25 | End: 2021-11-25

## 2020-11-25 RX ORDER — MELOXICAM 7.5 MG/1
7.5 TABLET ORAL DAILY
Qty: 30 TABLET | Refills: 11 | Status: SHIPPED | OUTPATIENT
Start: 2020-11-25 | End: 2021-02-17

## 2020-11-25 RX ORDER — GLUCOSAM/CHONDRO/HERB 149/HYAL 750-100 MG
TABLET ORAL
COMMUNITY
Start: 2020-11-25 | End: 2020-11-27 | Stop reason: SDUPTHER

## 2020-11-25 RX ORDER — POTASSIUM CHLORIDE 750 MG/1
10 CAPSULE, EXTENDED RELEASE ORAL DAILY
Qty: 30 CAPSULE | Refills: 11 | Status: SHIPPED | OUTPATIENT
Start: 2020-11-25 | End: 2021-01-28

## 2020-11-25 NOTE — ASSESSMENT & PLAN NOTE
START POTASSIUM SUPPLEMENTATION.  RECHECK POTASSIUM.  DISCUSSED SIGNS AND SYMPTOMS OF HYPER AND HYPOKALEMIA

## 2020-11-25 NOTE — PATIENT INSTRUCTIONS
Follow up in about 6 months (around 5/25/2021), or if symptoms worsen or fail to improve, for Med refills, LAB RESULTS.     If no improvement in symptoms or symptoms worsen, please be advised to call MD, follow-up at clinic and/or go to ER if becomes severe.    Alexis Guadalupe M.D.        We Offer TELEHEALTH & Same Day Appointments!   Book your Telehealth appointment with me through my nurse or   Clinic appointments on [employer]!    [address] [place] [address]    Office: [phone]   FAX: [phone]    Check out my Facebook Page and Follow Me at: [email or web address]    Check out my website at [employer] by clicking on: [email or web address]    To Schedule appointments online, go to [employer]: [email or web address]

## 2020-11-25 NOTE — ASSESSMENT & PLAN NOTE
DISCUSSED RESULTS IN DETAIL WITH THE PATIENT.  PATIENT DOES HAVE FOLLOW-UP WITH RHEUMATOLOGY FOR ESTABLISH CARE APPOINTMENT.  PATIENT WILL START MELOXICAM UNTIL FOLLOWING UP WITH THEM.  ALL QUESTIONS ANSWERED.

## 2020-11-25 NOTE — ASSESSMENT & PLAN NOTE
START FISH OIL AND NIACIN.  RECHECK LIPID PANEL IN 3 TO 6 MONTHS.Counseled on hyperlipidemia disease course, healthy diet and increased need for exercise.  Please be advised of the risk of cardiovascular disease, increase stroke and heart attack risk with uncontrolled/untreated hyperlipidemia.     Patient voiced understanding and understood the treatment plan. All questions were answered.

## 2020-11-25 NOTE — PROGRESS NOTES
Primary Care Telemedicine Note    The patient location is:  Patient Home - [place]  The chief complaint leading to consultation is:   Chief Complaint   Patient presents with    Results      Total time spent with patient:  15 min    Visit type: Virtual visit with synchronous audio only and video  Each patient to whom he or she provides medical services by telemedicine is:  (1) informed of the relationship between the physician and patient and the respective role of any other health care provider with respect to management of the patient; and (2) notified that he or she may decline to receive medical services by telemedicine and may withdraw from such care at any time.  =================================================================  PLAN:      Problem List Items Addressed This Visit     Chronic pain of left knee     Reviewed x-ray.  Mild narrowing on x-rays seen.  Patient advised to try anti-inflammatory medication.  Follow-up if no improvement.  Knee brace as needed.         Relevant Medications    meloxicam (MOBIC) 7.5 MG tablet    Left leg pain     X-RAY NEGATIVE.         Family history of rheumatoid arthritis     PATIENT WITH BRAYDON POSITIVE.  PATIENT HAS APPOINTMENT WITH RHEUMATOLOGY SOON.         Multiple joint pain    Relevant Medications    meloxicam (MOBIC) 7.5 MG tablet    Positive BRAYDON (antinuclear antibody) - Primary     DISCUSSED RESULTS IN DETAIL WITH THE PATIENT.  PATIENT DOES HAVE FOLLOW-UP WITH RHEUMATOLOGY FOR ESTABLISH CARE APPOINTMENT.  PATIENT WILL START MELOXICAM UNTIL FOLLOWING UP WITH THEM.  ALL QUESTIONS ANSWERED.         Elevated liver enzymes     CONTINUE ALCOHOL CESSATION.  MONITOR LIVER ENZYMES ROUTINELY.         Elevated MCV     CHECK VITAMIN LEVELS.  REASSURANCE PROVIDED.  CONTINUE ALCOHOL CESSATION.         Relevant Orders    Vitamin B12    Vitamin D    Iron and TIBC    Folate    Vitamin deficiency     CHECK VITAMIN LEVELS.         Relevant Orders    Vitamin B12    Vitamin D    Iron  and TIBC    Folate    Hyperlipidemia     START FISH OIL AND NIACIN.  RECHECK LIPID PANEL IN 3 TO 6 MONTHS.Counseled on hyperlipidemia disease course, healthy diet and increased need for exercise.  Please be advised of the risk of cardiovascular disease, increase stroke and heart attack risk with uncontrolled/untreated hyperlipidemia.     Patient voiced understanding and understood the treatment plan. All questions were answered.              Relevant Medications    omega 3-dha-epa-fish oil (FISH OIL) 1,000 mg (120 mg-180 mg) Cap    niacin 100 MG Tab    Hypokalemia     START POTASSIUM SUPPLEMENTATION.  RECHECK POTASSIUM.  DISCUSSED SIGNS AND SYMPTOMS OF HYPER AND HYPOKALEMIA         Relevant Medications    potassium chloride (MICRO-K) 10 MEQ CpSR        Future Appointments     Date Provider Specialty Appt Notes    12/8/2020 Britney Fox MD Obstetrics and Gynecology [facility] Exam    12/17/2020 Harsha Mario MD Rheumatology Positive BRAYDON (antinuclear antibody) [R76.8    5/18/2021  Lab     5/20/2021  Lab     5/25/2021 Alexis Guadalupe MD Family Medicine 6 mo f/u    10/14/2021 Alexis Guadalupe MD Family Medicine Annual Wellness           Medication List with Changes/Refills   New Medications    MELOXICAM (MOBIC) 7.5 MG TABLET    Take 1 tablet (7.5 mg total) by mouth once daily.    NIACIN 100 MG TAB    Take 1 tablet (100 mg total) by mouth every evening.    OMEGA 3-DHA-EPA-FISH OIL (FISH OIL) 1,000 MG (120 MG-180 MG) CAP    Per bottle    POTASSIUM CHLORIDE (MICRO-K) 10 MEQ CPSR    Take 1 capsule (10 mEq total) by mouth once daily.   Current Medications    ESOMEPRAZOLE (NEXIUM) 40 MG CAPSULE    Take 1 capsule (40 mg total) by mouth before breakfast.       Alexis Guadalupe M.D.     ==========================================================================  Subjective:      Patient ID: Sumi Niño is a 35 y.o. female.  has a past medical history of GERD (gastroesophageal reflux disease) and IBS (irritable  bowel syndrome).     Chief Complaint: Results      Problem List Items Addressed This Visit     Chronic pain of left knee    Overview     Chronic.  Patient has had history of traumatic falls.  Patient is not been evaluated with x-ray or by medical facility previously.  No recent trauma or fall.  Patient reports that the left knee will occasionally lock and call sharp pain.  X-Ray Tibia Fibula 2 View Left  Narrative: EXAMINATION:  XR TIBIA FIBULA 2 VIEW LEFT    CLINICAL HISTORY:  Pain in left knee    TECHNIQUE:  AP and lateral views of the left tibia and fibula were performed.    COMPARISON:  None.    FINDINGS:  No acute osseous abnormality.  Soft tissues appear normal.  Impression: As above    Electronically signed by: Kamlesh Christie MD  Date:    10/14/2020  Time:    12:44  X-ray Knee Ortho Left  Narrative: EXAMINATION:  XR KNEE ORTHO LEFT    CLINICAL HISTORY:  Pain in left leg    TECHNIQUE:  AP standing of both knees, Merchant views of both knees as well as a lateral view of the left knee were performed.    COMPARISON:  None    FINDINGS:  No effusion.  No patellar tilt.  No fracture or dislocation.  Minimal medial compartment narrowing on either side.  Impression: As above    Electronically signed by: Kamlesh Christie MD  Date:    10/14/2020  Time:    12:44             Current Assessment & Plan     Reviewed x-ray.  Mild narrowing on x-rays seen.  Patient advised to try anti-inflammatory medication.  Follow-up if no improvement.  Knee brace as needed.         Left leg pain    Overview     Chronic.  Patient reports that she had a fall where she landed on a piece of furniture and hit her tibia on in table.  Patient did have a open wound at that time with this was over two years ago.  She is still having pain and swelling in that area.         Current Assessment & Plan     X-RAY NEGATIVE.         Family history of rheumatoid arthritis    Overview     Strong family history of autoimmune         Current Assessment & Plan      PATIENT WITH BRAYDON POSITIVE.  PATIENT HAS APPOINTMENT WITH RHEUMATOLOGY SOON.         Multiple joint pain    Positive BRAYDON (antinuclear antibody) - Primary    Overview     New problem.  Patient recently screen with BRAYDON which was positive.  Patient has appointment with Rheumatology in December 2020.    Results for NOE VIRGEN (MRN [id number]) as of 11/25/2020 08:50   Ref. Range 11/20/2020 13:08   BRAYDON Screen Latest Ref Range: Negative <1:80  Positive (A)   BRAYDON Titer 1 Unknown 1:320   BRAYDON PATTERN 1 Unknown Homogeneous   BRAYDON Titer 2 Unknown 1:320   BRYADON Pattern 2 Unknown Speckled   ds DNA Ab Latest Ref Range: Negative 1:10  Negative 1:10   Rheumatoid Factor Latest Ref Range: 0.0 - 15.0 IU/mL <10.0            Current Assessment & Plan     DISCUSSED RESULTS IN DETAIL WITH THE PATIENT.  PATIENT DOES HAVE FOLLOW-UP WITH RHEUMATOLOGY FOR ESTABLISH CARE APPOINTMENT.  PATIENT WILL START MELOXICAM UNTIL FOLLOWING UP WITH THEM.  ALL QUESTIONS ANSWERED.         Elevated liver enzymes    Overview     Lab Results   Component Value Date    ALT 54 (H) 11/20/2020    AST 40 11/20/2020    ALKPHOS 55 11/20/2020    BILITOT 0.6 11/20/2020     CHRONIC.  STABLE.  PATIENT DOES DRINK A LARGE QUANTITY OF WINE EACH NIGHT.  PATIENT HAS STOP DRINKING ALCOHOL SINCE LAB RESULTS.         Current Assessment & Plan     CONTINUE ALCOHOL CESSATION.  MONITOR LIVER ENZYMES ROUTINELY.         Elevated MCV    Overview     RELATED TO ALCOHOL USE.         Current Assessment & Plan     CHECK VITAMIN LEVELS.  REASSURANCE PROVIDED.  CONTINUE ALCOHOL CESSATION.         Vitamin deficiency    Overview     Chronic. Vit d deficiency. Takes vitamin d supplement. No SE reported. Fatigue is PRESENT  No results found for: [id number]   No results found for: [id number]  No results found for: FOLATE           Current Assessment & Plan     CHECK VITAMIN LEVELS.         Hyperlipidemia    Overview     CHRONIC.  UNCONTROLLED.  PATIENT REPORTS HISTORY OF FAMILY HYPERCHOLESTEROLEMIA.   Lab analysis reviewed.   (-) CP, SOB, abdominal pain, N/V/D, constipation, jaundice, skin changes.  (-) Myalgias  Lab Results   Component Value Date    CHOL 324 (H) 11/20/2020     Lab Results   Component Value Date    HDL 97 (H) 11/20/2020     Lab Results   Component Value Date    LDLCALC 205.4 (H) 11/20/2020     Lab Results   Component Value Date    TRIG 108 11/20/2020     Lab Results   Component Value Date    CHOLHDL 29.9 11/20/2020     Lab Results   Component Value Date    TOTALCHOLEST 3.3 11/20/2020     Lab Results   Component Value Date    ALT 54 (H) 11/20/2020    AST 40 11/20/2020    ALKPHOS 55 11/20/2020    BILITOT 0.6 11/20/2020     ======================================================           Current Assessment & Plan     START FISH OIL AND NIACIN.  RECHECK LIPID PANEL IN 3 TO 6 MONTHS.Counseled on hyperlipidemia disease course, healthy diet and increased need for exercise.  Please be advised of the risk of cardiovascular disease, increase stroke and heart attack risk with uncontrolled/untreated hyperlipidemia.     Patient voiced understanding and understood the treatment plan. All questions were answered.              Hypokalemia    Overview     MILD HYPERKALEMIA ON RECENT LABS.  START POTASSIUM SUPPLEMENTATION.  PATIENT REPORTS LOW-POTASSIUM DIET.         Current Assessment & Plan     START POTASSIUM SUPPLEMENTATION.  RECHECK POTASSIUM.  DISCUSSED SIGNS AND SYMPTOMS OF HYPER AND HYPOKALEMIA                Past Medical History:  Past Medical History:   Diagnosis Date    GERD (gastroesophageal reflux disease)     IBS (irritable bowel syndrome)      History reviewed. No pertinent surgical history.  Review of patient's allergies indicates:   Allergen Reactions    Amoxicillin Hives, Itching, Other (See Comments), Rash and Swelling    Cephalexin Hives, Itching, Other (See Comments), Rash and Swelling    Sulfamethoxazole-trimethoprim Hives, Itching, Other (See Comments), Rash and Swelling    Codeine  Hallucinations, Hives, Itching and Nausea And Vomiting     Medication List with Changes/Refills   New Medications    MELOXICAM (MOBIC) 7.5 MG TABLET    Take 1 tablet (7.5 mg total) by mouth once daily.    NIACIN 100 MG TAB    Take 1 tablet (100 mg total) by mouth every evening.    OMEGA 3-DHA-EPA-FISH OIL (FISH OIL) 1,000 MG (120 MG-180 MG) CAP    Per bottle    POTASSIUM CHLORIDE (MICRO-K) 10 MEQ CPSR    Take 1 capsule (10 mEq total) by mouth once daily.   Current Medications    ESOMEPRAZOLE (NEXIUM) 40 MG CAPSULE    Take 1 capsule (40 mg total) by mouth before breakfast.      Social History     Tobacco Use    Smoking status: Never Smoker    Smokeless tobacco: Never Used   Substance Use Topics    Alcohol use: Yes     Alcohol/week: 1.0 standard drinks     Types: 1 Glasses of wine per week     Frequency: 4 or more times a week     Drinks per session: 1 or 2     Comment: 2020:  PATIENT HAS CUT BACK FROM THREE CLASSES TO ONE GLASS      Family History   Problem Relation Age of Onset    Arthritis Mother     COPD Mother     Cancer Maternal Grandfather         Melanoma and lung    Diabetes Paternal Aunt     Heart disease Father          from heart attack age 42    Hyperlipidemia Father     Hyperlipidemia Brother     Learning disabilities Brother         ADHD       I have reviewed the complete PMH, social history, surgical history, allergies and medications.  As well as family history.    Review of Systems   Constitutional: Positive for fatigue. Negative for chills, fever and unexpected weight change.   HENT: Negative for ear pain and sore throat.    Eyes: Negative for redness and visual disturbance.   Respiratory: Negative for cough and shortness of breath.    Cardiovascular: Negative for chest pain and palpitations.   Gastrointestinal: Positive for constipation and diarrhea. Negative for nausea and vomiting.   Genitourinary: Negative for difficulty urinating and hematuria.   Musculoskeletal:  Positive for arthralgias. Negative for myalgias.   Skin: Negative for rash and wound.   Neurological: Negative for weakness and headaches.   Psychiatric/Behavioral: Negative for sleep disturbance. The patient is not nervous/anxious.      Objective:   Wt 70.3 kg (155 lb)   BMI 29.29 kg/m²   Physical Exam  Constitutional:       General: She is not in acute distress.     Appearance: She is well-developed. She is not ill-appearing, toxic-appearing or diaphoretic.   HENT:      Head: Normocephalic and atraumatic.      Right Ear: Hearing and external ear normal.      Left Ear: Hearing and external ear normal.   Eyes:      General: Lids are normal.      Conjunctiva/sclera: Conjunctivae normal.   Neck:      Musculoskeletal: Normal range of motion.   Pulmonary:      Effort: Pulmonary effort is normal. No respiratory distress.   Musculoskeletal: Normal range of motion.   Skin:     Coloration: Skin is not pale.   Neurological:      Mental Status: She is alert. She is not disoriented.   Psychiatric:         Attention and Perception: She is attentive.         Mood and Affect: Mood is not anxious or depressed.         Speech: Speech is not rapid and pressured or slurred.         Behavior: Behavior normal. Behavior is not agitated, aggressive or hyperactive. Behavior is cooperative.         Thought Content: Thought content normal. Thought content is not paranoid or delusional. Thought content does not include homicidal or suicidal ideation. Thought content does not include homicidal or suicidal plan.         Cognition and Memory: Memory is not impaired.         Judgment: Judgment normal.         Assessment:     1. Positive BRAYDON (antinuclear antibody)    2. Multiple joint pain    3. Left leg pain    4. Chronic pain of left knee    5. Family history of rheumatoid arthritis    6. Elevated liver enzymes    7. Hypokalemia    8. Elevated MCV    9. Vitamin deficiency    10. Hyperlipidemia, unspecified hyperlipidemia type      MDM:    MODERATE COMPLEXITY.  MODERATE RISK.  I have Reviewed and summarized old records.  I have performed thorough medication reconciliation today and discussed risk and benefits of each medication.  I have reviewed IMAGING labs and discussed with patient.  All questions were answered.  I am requesting old records and will review them once they are available.  RHEUMATOLOGY    I have signed for the following orders AND/OR meds.  Orders Placed This Encounter   Procedures    Vitamin B12     Standing Status:   Future     Standing Expiration Date:   1/24/2022    Vitamin D     Standing Status:   Future     Standing Expiration Date:   1/24/2022    Iron and TIBC     Standing Status:   Future     Standing Expiration Date:   1/24/2022    Folate     Standing Status:   Future     Standing Expiration Date:   1/24/2022     Medications Ordered This Encounter   Medications    meloxicam (MOBIC) 7.5 MG tablet     Sig: Take 1 tablet (7.5 mg total) by mouth once daily.     Dispense:  30 tablet     Refill:  11    niacin 100 MG Tab     Sig: Take 1 tablet (100 mg total) by mouth every evening.     Refill:  0    omega 3-dha-epa-fish oil (FISH OIL) 1,000 mg (120 mg-180 mg) Cap     Sig: Per bottle     Dispense:       potassium chloride (MICRO-K) 10 MEQ CpSR     Sig: Take 1 capsule (10 mEq total) by mouth once daily.     Dispense:  30 capsule     Refill:  11        Follow up in about 6 months (around 5/25/2021), or if symptoms worsen or fail to improve, for Med refills, LAB RESULTS.    If no improvement in symptoms or symptoms worsen, advised to call/follow-up at clinic or go to ER. Patient voiced understanding and all questions/concerns were addressed.     DISCLAIMER: This note was compiled by using a speech recognition dictation system and therefore please be aware that typographical / speech recognition errors can and do occur.  Please contact me if you see any errors specifically.    Alexis Guadalupe M.D.       Office: [phone]    [address] [place] [address]  FAX: [phone]

## 2020-11-27 DIAGNOSIS — E78.5 HYPERLIPIDEMIA, UNSPECIFIED HYPERLIPIDEMIA TYPE: ICD-10-CM

## 2020-11-27 LAB — TTG IGA SER-ACNC: 5 UNITS

## 2020-11-28 RX ORDER — GLUCOSAM/CHONDRO/HERB 149/HYAL 750-100 MG
TABLET ORAL
COMMUNITY
Start: 2020-11-28

## 2020-12-06 ENCOUNTER — PATIENT OUTREACH (OUTPATIENT)
Dept: ADMINISTRATIVE | Facility: OTHER | Age: 35
End: 2020-12-06

## 2020-12-07 NOTE — PROGRESS NOTES
Health Maintenance Due   Topic Date Due    TETANUS VACCINE  07/29/2002    Cervical Cancer Screening  07/29/2005     Updates were requested from care everywhere.  Chart was reviewed for overdue Proactive [facility] Encounters ([facility]) topics (CRS, Breast Cancer Screening, Eye exam)  Health Maintenance has been updated.  LINKS immunization registry triggered.  Immunizations were reconciled.

## 2020-12-16 ENCOUNTER — TELEPHONE (OUTPATIENT)
Dept: RHEUMATOLOGY | Facility: CLINIC | Age: 35
End: 2020-12-16

## 2020-12-16 NOTE — TELEPHONE ENCOUNTER
----- Message from Deb Zambrano sent at 12/16/2020  2:01 PM CST -----  Contact: Sumi Tabares called in regards to labs for tomorrow's appt. Please call back at [phone]. Thanks jh

## 2020-12-17 ENCOUNTER — OFFICE VISIT (OUTPATIENT)
Dept: RHEUMATOLOGY | Facility: CLINIC | Age: 35
End: 2020-12-17
Payer: COMMERCIAL

## 2020-12-17 VITALS
WEIGHT: 153 LBS | HEART RATE: 61 BPM | BODY MASS INDEX: 28.89 KG/M2 | HEIGHT: 61 IN | DIASTOLIC BLOOD PRESSURE: 86 MMHG | SYSTOLIC BLOOD PRESSURE: 130 MMHG

## 2020-12-17 DIAGNOSIS — M25.50 ARTHRALGIA, UNSPECIFIED JOINT: ICD-10-CM

## 2020-12-17 DIAGNOSIS — Z71.89 COUNSELING ON HEALTH PROMOTION AND DISEASE PREVENTION: ICD-10-CM

## 2020-12-17 DIAGNOSIS — R76.8 POSITIVE ANA (ANTINUCLEAR ANTIBODY): Primary | ICD-10-CM

## 2020-12-17 PROCEDURE — 99244 OFF/OP CNSLTJ NEW/EST MOD 40: CPT | Mod: S$GLB,,, | Performed by: INTERNAL MEDICINE

## 2020-12-17 PROCEDURE — 3008F BODY MASS INDEX DOCD: CPT | Mod: CPTII,S$GLB,, | Performed by: INTERNAL MEDICINE

## 2020-12-17 PROCEDURE — 99244 PR OFFICE CONSULTATION,LEVEL IV: ICD-10-PCS | Mod: S$GLB,,, | Performed by: INTERNAL MEDICINE

## 2020-12-17 PROCEDURE — 3008F PR BODY MASS INDEX (BMI) DOCUMENTED: ICD-10-PCS | Mod: CPTII,S$GLB,, | Performed by: INTERNAL MEDICINE

## 2020-12-17 PROCEDURE — 1125F PR PAIN SEVERITY QUANTIFIED, PAIN PRESENT: ICD-10-PCS | Mod: S$GLB,,, | Performed by: INTERNAL MEDICINE

## 2020-12-17 PROCEDURE — 99999 PR PBB SHADOW E&M-EST. PATIENT-LVL III: CPT | Mod: PBBFAC,,, | Performed by: INTERNAL MEDICINE

## 2020-12-17 PROCEDURE — 99999 PR PBB SHADOW E&M-EST. PATIENT-LVL III: ICD-10-PCS | Mod: PBBFAC,,, | Performed by: INTERNAL MEDICINE

## 2020-12-17 PROCEDURE — 1125F AMNT PAIN NOTED PAIN PRSNT: CPT | Mod: S$GLB,,, | Performed by: INTERNAL MEDICINE

## 2020-12-17 NOTE — PROGRESS NOTES
RHEUMATOLOGY OUTPATIENT CLINIC NOTE    12/17/2020    Attending Rheumatologist: Harsha Mario  Primary Care Provider: Alexis Guadalupe MD   Physician Requesting Consultation: Alexis Guadalupe MD  [address] [place] [address]  Chief Complaint/Reason For Consultation:  Positive BRAYDON    Subjective:       HPI  Sumi Niño is a 35 y.o. White female referred for abnormal lab results (BRAYDON).  Labs done as part of work-up for episodic arthralgias and weight gain.  Voices no acute complaints.  Intermittent arthralgias, worse on hands and knees.  With present worst in the evening, aggravated by prolonged range of motion/weight bearing, relieved somewhat by rest and OTC pain medication.  Denies association with prolonged morning stiffness, redness, or joint swelling.  Denies any active rash or photosensitivity.  Does not have tri-color discoloration of fingertips upon cold exposure or ulcers.  Denies persisent paresthesias, fever, or hematuria.    Review of Systems   Constitutional: Negative for chills, fever and malaise/fatigue.   HENT:        Denies significant sicca symptoms   Eyes: Negative for pain and redness.        No history uveitis   Respiratory: Negative for cough, hemoptysis and shortness of breath.    Cardiovascular: Negative for chest pain and leg swelling.   Gastrointestinal: Negative for abdominal pain, blood in stool and melena.        No history IBD   Genitourinary: Negative for dysuria and hematuria.   Musculoskeletal: Negative for falls and joint pain (Episodic arthralgias with prominent mechanical features.).   Skin: Negative for rash.        No history of psoriasis.   Neurological: Negative for tingling, focal weakness and weakness.   Psychiatric/Behavioral: Negative for memory loss. The patient does not have insomnia.      Chronic comorbid conditions affecting medical decision making today:  Past Medical History:   Diagnosis Date    GERD (gastroesophageal reflux disease)     IBS  "(irritable bowel syndrome)      History reviewed. No pertinent surgical history.  Family History   Problem Relation Age of Onset    Arthritis Mother     COPD Mother     Cancer Maternal Grandfather         Melanoma and lung    Diabetes Paternal Aunt     Heart disease Father          from heart attack age 42    Hyperlipidemia Father     Hyperlipidemia Brother     Learning disabilities Brother         ADHD     Social History     Substance and Sexual Activity   Alcohol Use Yes    Alcohol/week: 1.0 standard drinks    Types: 1 Glasses of wine per week    Frequency: 4 or more times a week    Drinks per session: 1 or 2    Comment: 2020:  PATIENT HAS CUT BACK FROM THREE CLASSES TO ONE GLASS     Social History     Tobacco Use   Smoking Status Never Smoker   Smokeless Tobacco Never Used     Social History     Substance and Sexual Activity   Drug Use Never       Current Outpatient Medications:     esomeprazole (NEXIUM) 40 MG capsule, Take 1 capsule (40 mg total) by mouth before breakfast., Disp: 30 capsule, Rfl: 11    niacin 100 MG Tab, Take 1 tablet (100 mg total) by mouth every evening., Disp: , Rfl: 0    omega 3-dha-epa-fish oil (FISH OIL) 1,000 mg (120 mg-180 mg) Cap, Per bottle, Disp: , Rfl:     meloxicam (MOBIC) 7.5 MG tablet, Take 1 tablet (7.5 mg total) by mouth once daily. (Patient not taking: Reported on 2020), Disp: 30 tablet, Rfl: 11    potassium chloride (MICRO-K) 10 MEQ CpSR, Take 1 capsule (10 mEq total) by mouth once daily. (Patient not taking: Reported on 2020), Disp: 30 capsule, Rfl: 11     Objective:         Vitals:    20 1615   BP: 130/86   Pulse: 61     Physical Exam   Constitutional: No distress.   Estimated body mass index is 28.91 kg/m² as calculated from the following:    Height as of this encounter: 5' 1" (1.549 m).    Weight as of this encounter: 69.4 kg (153 lb).    Wt Readings from Last 1 Encounters:  20 1615 : 69.4 kg (153 lb)     HENT:   Head: " Normocephalic and atraumatic.   Eyes: Conjunctivae are normal. Pupils are equal, round, and reactive to light.   Neck: Normal range of motion.   Cardiovascular: Normal rate and intact distal pulses.    Pulmonary/Chest: Effort normal. No respiratory distress.   Abdominal: Soft. She exhibits no distension.   Neurological: She is alert. Gait normal.   Skin: No rash noted. No erythema.     Musculoskeletal: Normal range of motion.      Comments: : strong  No synovitis or significant squeeze tenderness    AROM: intact  PROM: intact    Devices used by patient: none       Reviewed old and all outside pertinent medical records available.    All lab results personally reviewed and interpreted by me.  Lab Results   Component Value Date    WBC 9.32 11/20/2020    HGB 13.8 11/20/2020    HCT 44.5 11/20/2020     (H) 11/20/2020    MCH 32.7 (H) 11/20/2020    MCHC 31.0 (L) 11/20/2020    RDW 11.9 11/20/2020     11/20/2020    MPV 11.4 11/20/2020       Lab Results   Component Value Date     11/20/2020    K 3.4 (L) 11/20/2020     11/20/2020    CO2 19 (L) 11/20/2020    GLU 78 11/20/2020    BUN 8 11/20/2020    CALCIUM 9.0 11/20/2020    PROT 7.7 11/20/2020    ALBUMIN 4.3 11/20/2020    BILITOT 0.6 11/20/2020    AST 40 11/20/2020    ALKPHOS 55 11/20/2020    ALT 54 (H) 11/20/2020       No results found for: COLORU, APPEARANCEUA, SPECGRAV, PHUR, PROTEINUA, GLUCOSEU, KETONESU, BLOODU, LEUKOCYTESUR, NITRITE, UROBILINOGEN    Lab Results   Component Value Date    CRP 8.4 (H) 11/20/2020       Lab Results   Component Value Date    SEDRATE 4 11/20/2020       Lab Results   Component Value Date    RF <10.0 11/20/2020    SEDRATE 4 11/20/2020       No components found for: 25OHVITDTOT, 22YIITPP8, 57JKBBVD6, METHODNOTE    No results found for: URICACID    No components found for: TSPOTTB    · TSH within normal range    Rheum Labs:   BRAYDON 1 in 320 homogeneous/speckled   RHONDA negative   Rheumatoid factor negative     Infectious  Labs:   n/a     Imaging:  All imaging reviewed and independently interpreted by me.    X-ray knees October 2020  No effusion.  No patellar tilt.  No fracture or dislocation.  Minimal medial compartment narrowing on either side.     ASSESSMENT / PLAN:     Sumi Niño is a 35 y.o. White female with:    1. Positive BRAYDON (antinuclear antibody)  - episodic arthralgias with mechanical pattern.  Weight gain.  Family history of thyroid disease  - currently without generalized synovitis, serositis, rash, fever, cytopenias, or dsDNA binding  - Macrocytosis and side elevation of liver chemistries workup per PMD.  Would consider sending for thyroid antibodies  - consider trial of duloxetine or gabapentin if persistent arthralgias.    - no rheumatic indication for immunosuppression at this time.    - C3 Complement; Standing  - C4 Complement; Standing  - Anti-DNA Ab, Double-Stranded; Standing  - Urinalysis; Standing  - Protein/Creatinine Ratio, Urine; Standing  - Cyclic Citrullinated Peptide Antibody, IgG; Future  - Hepatitis Panel, Acute; Future    2. Other specified counseling  - over 10 minutes spent regarding below topics:  - Immunization counseling done.  - Weight loss counseling done.  - Nutrition and exercise counseling.  - Limitation of alcohol consumption.  - Regular exercise:  Aerobic and resistance.  - Medication counseling provided.    Follow up in about 5 months (around 5/17/2021).    Method of contact with patient concerns: [facility]    Disclaimer:  This note is prepared using voice recognition software and as such is likely to have errors and has not been proof read. Please contact me for questions.     Time spent: 60 minutes in face to face discussion concerning diagnosis, prognosis, review of lab and test results, benefits of treatment as well as management of disease, counseling of patient and coordination of care between various health care providers.  Greater than half the time spent was used  for coordination of care and counseling of patient.    Harsha Mario M.D.  Rheumatology Department   [facility]

## 2020-12-17 NOTE — LETTER
December 17, 2020      Alexis Guadalupe MD  [address] [place] [address]           [facility]  [address] [place] [address] DR RICH SCHWARZ [address]  Phone: [phone]  Fax: [phone]          Patient: Sumi Niño   MR Number: [id number]   YOB: 1985   Date of Visit: 12/17/2020       Dear Dr. Alexis Guadalupe:    Thank you for referring Sumi Niño to me for evaluation. Attached you will find relevant portions of my assessment and plan of care.    If you have questions, please do not hesitate to call me. I look forward to following Sumi Niño along with you.    Sincerely,    Harsha Mario MD    Enclosure  CC:  No Recipients    If you would like to receive this communication electronically, please contact [email or web address] or [phone] to request more information on [employer] Link access.    For providers and/or their staff who would like to refer a patient to [facility], please contact us through our one-stop-shop provider referral line, [facility], at [phone].    If you feel you have received this communication in error or would no longer like to receive these types of communications, please e-mail [email or web address]

## 2020-12-17 NOTE — PATIENT INSTRUCTIONS
Arthralgia    Arthralgia is the term for pain in or around the joint. It is a symptom, not a disease. This pain may involve one or more joints. In some cases, the pain moves from joint to joint.  There are many causes for joint pain. These include:  · Injury  · Osteoarthritis (wearing out of the joint surface)  · Gout (inflammation of the joint due to crystals in the joint fluid)  · Infection inside the joint    · Bursitis (inflammation of the fluid-filled sacs around the joint)  · Autoimmune disorders such as rheumatoid arthritis or lupus  · Tendonitis (inflammation of chords that attach muscle to bone)  Home care  · Rest the involved joint(s) until your symptoms improve.   · You may be prescribed pain medicine. If none is prescribed, you may use acetaminophen or ibuprofen to control pain and inflammation.  Follow-up care  Follow up with your healthcare provider or as advised.  When to seek medical advice  Contact your healthcare provider right away if any of the following occurs:  · Pain, swelling, or redness of joint increases  · Pain worsens or recurs after a period of improvement  · Pain moves to other joints  · You cannot bear weight on the affected joint   · You cannot move the affected joint  · Joint appears deformed  · New rash appears  · Fever of 100.4ºF (38ºC) or higher, or as directed by your healthcare provider  Date Last Reviewed: 3/1/2017  © 4506-7720 [employer]. [address] [place] [address], [place] [address]. All rights reserved. This information is not intended as a substitute for professional medical care. Always follow your healthcare professional's instructions.

## 2021-01-25 ENCOUNTER — PATIENT OUTREACH (OUTPATIENT)
Dept: ADMINISTRATIVE | Facility: OTHER | Age: 36
End: 2021-01-25

## 2021-01-26 ENCOUNTER — OFFICE VISIT (OUTPATIENT)
Dept: OBSTETRICS AND GYNECOLOGY | Facility: CLINIC | Age: 36
End: 2021-01-26
Payer: COMMERCIAL

## 2021-01-26 VITALS
SYSTOLIC BLOOD PRESSURE: 120 MMHG | WEIGHT: 152.31 LBS | HEIGHT: 61 IN | BODY MASS INDEX: 28.76 KG/M2 | DIASTOLIC BLOOD PRESSURE: 70 MMHG

## 2021-01-26 DIAGNOSIS — R79.89 LFTS ABNORMAL: ICD-10-CM

## 2021-01-26 DIAGNOSIS — R63.5 WEIGHT GAIN: ICD-10-CM

## 2021-01-26 DIAGNOSIS — E78.5 HYPERLIPIDEMIA, UNSPECIFIED HYPERLIPIDEMIA TYPE: ICD-10-CM

## 2021-01-26 DIAGNOSIS — Z76.89 REFERRAL OF PATIENT: ICD-10-CM

## 2021-01-26 DIAGNOSIS — Z01.419 GYNECOLOGIC EXAM NORMAL: Primary | ICD-10-CM

## 2021-01-26 DIAGNOSIS — F10.10 ALCOHOL ABUSE: ICD-10-CM

## 2021-01-26 DIAGNOSIS — R76.8 ANA POSITIVE: ICD-10-CM

## 2021-01-26 DIAGNOSIS — N76.0 ACUTE VAGINITIS: ICD-10-CM

## 2021-01-26 DIAGNOSIS — N92.6 IRREGULAR PERIODS: ICD-10-CM

## 2021-01-26 PROCEDURE — 87481 CANDIDA DNA AMP PROBE: CPT | Mod: 59

## 2021-01-26 PROCEDURE — 88142 CYTOPATH C/V THIN LAYER: CPT

## 2021-01-26 PROCEDURE — 87624 HPV HI-RISK TYP POOLED RSLT: CPT

## 2021-01-26 PROCEDURE — 99385 PR PREVENTIVE VISIT,NEW,18-39: ICD-10-PCS | Mod: S$GLB,,, | Performed by: OBSTETRICS & GYNECOLOGY

## 2021-01-26 PROCEDURE — 87661 TRICHOMONAS VAGINALIS AMPLIF: CPT

## 2021-01-26 PROCEDURE — 1126F PR PAIN SEVERITY QUANTIFIED, NO PAIN PRESENT: ICD-10-PCS | Mod: S$GLB,,, | Performed by: OBSTETRICS & GYNECOLOGY

## 2021-01-26 PROCEDURE — 1126F AMNT PAIN NOTED NONE PRSNT: CPT | Mod: S$GLB,,, | Performed by: OBSTETRICS & GYNECOLOGY

## 2021-01-26 PROCEDURE — 3008F BODY MASS INDEX DOCD: CPT | Mod: CPTII,S$GLB,, | Performed by: OBSTETRICS & GYNECOLOGY

## 2021-01-26 PROCEDURE — 99999 PR PBB SHADOW E&M-EST. PATIENT-LVL IV: CPT | Mod: PBBFAC,,, | Performed by: OBSTETRICS & GYNECOLOGY

## 2021-01-26 PROCEDURE — 99385 PREV VISIT NEW AGE 18-39: CPT | Mod: S$GLB,,, | Performed by: OBSTETRICS & GYNECOLOGY

## 2021-01-26 PROCEDURE — 99999 PR PBB SHADOW E&M-EST. PATIENT-LVL IV: ICD-10-PCS | Mod: PBBFAC,,, | Performed by: OBSTETRICS & GYNECOLOGY

## 2021-01-26 PROCEDURE — 3008F PR BODY MASS INDEX (BMI) DOCUMENTED: ICD-10-PCS | Mod: CPTII,S$GLB,, | Performed by: OBSTETRICS & GYNECOLOGY

## 2021-01-26 RX ORDER — MEDROXYPROGESTERONE ACETATE 10 MG/1
10 TABLET ORAL DAILY
Qty: 30 TABLET | Refills: 11 | Status: SHIPPED | OUTPATIENT
Start: 2021-01-26 | End: 2021-02-17

## 2021-01-26 RX ORDER — MONTELUKAST SODIUM 10 MG/1
TABLET ORAL
COMMUNITY
Start: 2020-10-14 | End: 2021-11-11

## 2021-01-28 ENCOUNTER — PATIENT MESSAGE (OUTPATIENT)
Dept: OBSTETRICS AND GYNECOLOGY | Facility: CLINIC | Age: 36
End: 2021-01-28

## 2021-01-28 LAB
BACTERIAL VAGINOSIS DNA: POSITIVE
CANDIDA GLABRATA DNA: NEGATIVE
CANDIDA KRUSEI DNA: NEGATIVE
CANDIDA RRNA VAG QL PROBE: NEGATIVE
T VAGINALIS RRNA GENITAL QL PROBE: NEGATIVE

## 2021-01-28 RX ORDER — METRONIDAZOLE 500 MG/1
500 TABLET ORAL 2 TIMES DAILY
Qty: 14 TABLET | Refills: 0 | Status: SHIPPED | OUTPATIENT
Start: 2021-01-28 | End: 2021-02-04

## 2021-02-01 LAB
HPV HR 12 DNA SPEC QL NAA+PROBE: NEGATIVE
HPV16 AG SPEC QL: NEGATIVE
HPV18 DNA SPEC QL NAA+PROBE: NEGATIVE

## 2021-02-12 ENCOUNTER — PATIENT OUTREACH (OUTPATIENT)
Dept: ADMINISTRATIVE | Facility: HOSPITAL | Age: 36
End: 2021-02-12

## 2021-02-12 ENCOUNTER — PATIENT MESSAGE (OUTPATIENT)
Dept: ADMINISTRATIVE | Facility: HOSPITAL | Age: 36
End: 2021-02-12

## 2021-02-12 RX ORDER — MEDROXYPROGESTERONE ACETATE 10 MG/1
10 TABLET ORAL
COMMUNITY
Start: 2021-02-12 | End: 2022-02-12

## 2021-02-15 ENCOUNTER — NURSE TRIAGE (OUTPATIENT)
Dept: ADMINISTRATIVE | Facility: CLINIC | Age: 36
End: 2021-02-15

## 2021-02-17 ENCOUNTER — PATIENT MESSAGE (OUTPATIENT)
Dept: GASTROENTEROLOGY | Facility: CLINIC | Age: 36
End: 2021-02-17

## 2021-02-17 ENCOUNTER — OFFICE VISIT (OUTPATIENT)
Dept: FAMILY MEDICINE | Facility: CLINIC | Age: 36
End: 2021-02-17
Payer: COMMERCIAL

## 2021-02-17 ENCOUNTER — PATIENT MESSAGE (OUTPATIENT)
Dept: FAMILY MEDICINE | Facility: CLINIC | Age: 36
End: 2021-02-17

## 2021-02-17 ENCOUNTER — TELEPHONE (OUTPATIENT)
Dept: FAMILY MEDICINE | Facility: CLINIC | Age: 36
End: 2021-02-17

## 2021-02-17 DIAGNOSIS — R19.7 DIARRHEA, UNSPECIFIED TYPE: ICD-10-CM

## 2021-02-17 DIAGNOSIS — Z09 HOSPITAL DISCHARGE FOLLOW-UP: Primary | ICD-10-CM

## 2021-02-17 DIAGNOSIS — F41.9 ANXIETY: ICD-10-CM

## 2021-02-17 PROCEDURE — 99214 PR OFFICE/OUTPT VISIT, EST, LEVL IV, 30-39 MIN: ICD-10-PCS | Mod: 95,,, | Performed by: FAMILY MEDICINE

## 2021-02-17 PROCEDURE — 99214 OFFICE O/P EST MOD 30 MIN: CPT | Mod: 95,,, | Performed by: FAMILY MEDICINE

## 2021-02-17 RX ORDER — HYDROXYZINE HYDROCHLORIDE 25 MG/1
25 TABLET, FILM COATED ORAL 3 TIMES DAILY PRN
Qty: 30 TABLET | Refills: 0 | Status: SHIPPED | OUTPATIENT
Start: 2021-02-17 | End: 2021-03-04

## 2021-02-17 RX ORDER — DULOXETIN HYDROCHLORIDE 30 MG/1
30 CAPSULE, DELAYED RELEASE ORAL DAILY
Qty: 30 CAPSULE | Refills: 1 | Status: SHIPPED | OUTPATIENT
Start: 2021-02-17 | End: 2021-03-09

## 2021-02-22 ENCOUNTER — TELEPHONE (OUTPATIENT)
Dept: FAMILY MEDICINE | Facility: CLINIC | Age: 36
End: 2021-02-22

## 2021-02-23 LAB
FINAL PATHOLOGIC DIAGNOSIS: NORMAL
Lab: NORMAL

## 2021-02-24 ENCOUNTER — LAB VISIT (OUTPATIENT)
Dept: LAB | Facility: HOSPITAL | Age: 36
End: 2021-02-24
Attending: FAMILY MEDICINE
Payer: COMMERCIAL

## 2021-02-24 DIAGNOSIS — Z00.00 WELL ADULT EXAM: ICD-10-CM

## 2021-02-24 DIAGNOSIS — Z13.220 ENCOUNTER FOR LIPID SCREENING FOR CARDIOVASCULAR DISEASE: ICD-10-CM

## 2021-02-24 DIAGNOSIS — Z79.899 ENCOUNTER FOR LONG-TERM (CURRENT) USE OF MEDICATIONS: ICD-10-CM

## 2021-02-24 DIAGNOSIS — R76.8 POSITIVE ANA (ANTINUCLEAR ANTIBODY): ICD-10-CM

## 2021-02-24 DIAGNOSIS — E56.9 VITAMIN DEFICIENCY: ICD-10-CM

## 2021-02-24 DIAGNOSIS — Z13.6 ENCOUNTER FOR LIPID SCREENING FOR CARDIOVASCULAR DISEASE: ICD-10-CM

## 2021-02-24 DIAGNOSIS — R71.8 ELEVATED MCV: ICD-10-CM

## 2021-02-24 PROCEDURE — 82306 VITAMIN D 25 HYDROXY: CPT

## 2021-02-24 PROCEDURE — 80053 COMPREHEN METABOLIC PANEL: CPT

## 2021-02-24 PROCEDURE — 82746 ASSAY OF FOLIC ACID SERUM: CPT

## 2021-02-24 PROCEDURE — 82607 VITAMIN B-12: CPT

## 2021-02-24 PROCEDURE — 86160 COMPLEMENT ANTIGEN: CPT | Mod: 59

## 2021-02-24 PROCEDURE — 83540 ASSAY OF IRON: CPT

## 2021-02-24 PROCEDURE — 80061 LIPID PANEL: CPT

## 2021-02-24 PROCEDURE — 86225 DNA ANTIBODY NATIVE: CPT

## 2021-02-24 PROCEDURE — 80074 ACUTE HEPATITIS PANEL: CPT

## 2021-02-24 PROCEDURE — 86160 COMPLEMENT ANTIGEN: CPT

## 2021-02-24 PROCEDURE — 36415 COLL VENOUS BLD VENIPUNCTURE: CPT | Mod: PO

## 2021-02-24 PROCEDURE — 84443 ASSAY THYROID STIM HORMONE: CPT

## 2021-02-24 PROCEDURE — 86200 CCP ANTIBODY: CPT

## 2021-02-25 ENCOUNTER — PATIENT MESSAGE (OUTPATIENT)
Dept: FAMILY MEDICINE | Facility: CLINIC | Age: 36
End: 2021-02-25

## 2021-02-25 LAB
25(OH)D3+25(OH)D2 SERPL-MCNC: 43 NG/ML (ref 30–96)
ALBUMIN SERPL BCP-MCNC: 4.2 G/DL (ref 3.5–5.2)
ALP SERPL-CCNC: 46 U/L (ref 55–135)
ALT SERPL W/O P-5'-P-CCNC: 25 U/L (ref 10–44)
ANION GAP SERPL CALC-SCNC: 10 MMOL/L (ref 8–16)
AST SERPL-CCNC: 21 U/L (ref 10–40)
BILIRUB SERPL-MCNC: 0.4 MG/DL (ref 0.1–1)
BUN SERPL-MCNC: 7 MG/DL (ref 6–20)
C3 SERPL-MCNC: 135 MG/DL (ref 50–180)
C4 SERPL-MCNC: 44 MG/DL (ref 11–44)
CALCIUM SERPL-MCNC: 9 MG/DL (ref 8.7–10.5)
CCP AB SER IA-ACNC: <0.5 U/ML
CHLORIDE SERPL-SCNC: 104 MMOL/L (ref 95–110)
CHOLEST SERPL-MCNC: 301 MG/DL (ref 120–199)
CHOLEST/HDLC SERPL: 4.4 {RATIO} (ref 2–5)
CO2 SERPL-SCNC: 23 MMOL/L (ref 23–29)
CREAT SERPL-MCNC: 0.7 MG/DL (ref 0.5–1.4)
DSDNA AB SER-ACNC: NORMAL [IU]/ML
EST. GFR  (AFRICAN AMERICAN): >60 ML/MIN/1.73 M^2
EST. GFR  (NON AFRICAN AMERICAN): >60 ML/MIN/1.73 M^2
FOLATE SERPL-MCNC: 4.6 NG/ML (ref 4–24)
GLUCOSE SERPL-MCNC: 93 MG/DL (ref 70–110)
HAV IGM SERPL QL IA: NEGATIVE
HBV CORE IGM SERPL QL IA: NEGATIVE
HBV SURFACE AG SERPL QL IA: NEGATIVE
HCV AB SERPL QL IA: NEGATIVE
HDLC SERPL-MCNC: 68 MG/DL (ref 40–75)
HDLC SERPL: 22.6 % (ref 20–50)
IRON SERPL-MCNC: 83 UG/DL (ref 30–160)
LDLC SERPL CALC-MCNC: 212.4 MG/DL (ref 63–159)
NONHDLC SERPL-MCNC: 233 MG/DL
POTASSIUM SERPL-SCNC: 3.8 MMOL/L (ref 3.5–5.1)
PROT SERPL-MCNC: 7.6 G/DL (ref 6–8.4)
SATURATED IRON: 20 % (ref 20–50)
SODIUM SERPL-SCNC: 137 MMOL/L (ref 136–145)
TOTAL IRON BINDING CAPACITY: 410 UG/DL (ref 250–450)
TRANSFERRIN SERPL-MCNC: 277 MG/DL (ref 200–375)
TRIGL SERPL-MCNC: 103 MG/DL (ref 30–150)
TSH SERPL DL<=0.005 MIU/L-ACNC: 2.3 UIU/ML (ref 0.4–4)
VIT B12 SERPL-MCNC: 345 PG/ML (ref 210–950)

## 2021-02-26 DIAGNOSIS — E78.2 MIXED HYPERLIPIDEMIA: Primary | ICD-10-CM

## 2021-02-26 DIAGNOSIS — E53.8 FOLATE DEFICIENCY: ICD-10-CM

## 2021-02-26 DIAGNOSIS — E61.1 IRON DEFICIENCY: ICD-10-CM

## 2021-02-26 DIAGNOSIS — E53.8 B12 DEFICIENCY: ICD-10-CM

## 2021-02-26 RX ORDER — FERROUS SULFATE 325(65) MG
325 TABLET ORAL
Qty: 30 TABLET | Refills: 11 | Status: SHIPPED | OUTPATIENT
Start: 2021-02-26 | End: 2022-02-26

## 2021-02-26 RX ORDER — ATORVASTATIN CALCIUM 10 MG/1
10 TABLET, FILM COATED ORAL DAILY
Qty: 90 TABLET | Refills: 3 | Status: SHIPPED | OUTPATIENT
Start: 2021-02-26 | End: 2021-05-18

## 2021-02-26 RX ORDER — LANOLIN ALCOHOL/MO/W.PET/CERES
1000 CREAM (GRAM) TOPICAL DAILY
Qty: 90 TABLET | Refills: 3 | Status: SHIPPED | OUTPATIENT
Start: 2021-02-26 | End: 2022-02-26

## 2021-02-26 RX ORDER — FOLIC ACID 1 MG/1
1 TABLET ORAL DAILY
Qty: 90 TABLET | Refills: 3 | Status: SHIPPED | OUTPATIENT
Start: 2021-02-26 | End: 2022-02-26

## 2021-02-27 ENCOUNTER — PATIENT MESSAGE (OUTPATIENT)
Dept: FAMILY MEDICINE | Facility: CLINIC | Age: 36
End: 2021-02-27

## 2021-03-04 ENCOUNTER — OFFICE VISIT (OUTPATIENT)
Dept: GASTROENTEROLOGY | Facility: CLINIC | Age: 36
End: 2021-03-04
Payer: COMMERCIAL

## 2021-03-04 ENCOUNTER — TELEPHONE (OUTPATIENT)
Dept: FAMILY MEDICINE | Facility: CLINIC | Age: 36
End: 2021-03-04

## 2021-03-04 ENCOUNTER — PATIENT OUTREACH (OUTPATIENT)
Dept: ADMINISTRATIVE | Facility: OTHER | Age: 36
End: 2021-03-04

## 2021-03-04 VITALS — HEIGHT: 61 IN | WEIGHT: 146.81 LBS | BODY MASS INDEX: 27.72 KG/M2

## 2021-03-04 DIAGNOSIS — Z01.818 PRE-OP TESTING: ICD-10-CM

## 2021-03-04 DIAGNOSIS — R19.7 DIARRHEA, UNSPECIFIED TYPE: ICD-10-CM

## 2021-03-04 PROCEDURE — 99204 OFFICE O/P NEW MOD 45 MIN: CPT | Mod: S$GLB,,, | Performed by: INTERNAL MEDICINE

## 2021-03-04 PROCEDURE — 1126F AMNT PAIN NOTED NONE PRSNT: CPT | Mod: S$GLB,,, | Performed by: INTERNAL MEDICINE

## 2021-03-04 PROCEDURE — 99999 PR PBB SHADOW E&M-EST. PATIENT-LVL III: CPT | Mod: PBBFAC,,, | Performed by: INTERNAL MEDICINE

## 2021-03-04 PROCEDURE — 3008F PR BODY MASS INDEX (BMI) DOCUMENTED: ICD-10-PCS | Mod: CPTII,S$GLB,, | Performed by: INTERNAL MEDICINE

## 2021-03-04 PROCEDURE — 3008F BODY MASS INDEX DOCD: CPT | Mod: CPTII,S$GLB,, | Performed by: INTERNAL MEDICINE

## 2021-03-04 PROCEDURE — 1126F PR PAIN SEVERITY QUANTIFIED, NO PAIN PRESENT: ICD-10-PCS | Mod: S$GLB,,, | Performed by: INTERNAL MEDICINE

## 2021-03-04 PROCEDURE — 99999 PR PBB SHADOW E&M-EST. PATIENT-LVL III: ICD-10-PCS | Mod: PBBFAC,,, | Performed by: INTERNAL MEDICINE

## 2021-03-04 PROCEDURE — 99204 PR OFFICE/OUTPT VISIT, NEW, LEVL IV, 45-59 MIN: ICD-10-PCS | Mod: S$GLB,,, | Performed by: INTERNAL MEDICINE

## 2021-03-04 RX ORDER — DICYCLOMINE HYDROCHLORIDE 10 MG/1
10 CAPSULE ORAL
Qty: 30 CAPSULE | Refills: 1 | Status: SHIPPED | OUTPATIENT
Start: 2021-03-04 | End: 2021-04-03

## 2021-03-09 ENCOUNTER — OFFICE VISIT (OUTPATIENT)
Dept: FAMILY MEDICINE | Facility: CLINIC | Age: 36
End: 2021-03-09
Payer: COMMERCIAL

## 2021-03-09 DIAGNOSIS — F41.9 ANXIETY: Primary | ICD-10-CM

## 2021-03-09 DIAGNOSIS — Z79.899 ENCOUNTER FOR LONG-TERM (CURRENT) USE OF MEDICATIONS: ICD-10-CM

## 2021-03-09 DIAGNOSIS — E56.9 VITAMIN DEFICIENCY: ICD-10-CM

## 2021-03-09 DIAGNOSIS — R19.8 ALTERNATING CONSTIPATION AND DIARRHEA: ICD-10-CM

## 2021-03-09 PROCEDURE — 99214 OFFICE O/P EST MOD 30 MIN: CPT | Mod: 95,,, | Performed by: FAMILY MEDICINE

## 2021-03-09 PROCEDURE — 99214 PR OFFICE/OUTPT VISIT, EST, LEVL IV, 30-39 MIN: ICD-10-PCS | Mod: 95,,, | Performed by: FAMILY MEDICINE

## 2021-03-09 RX ORDER — BUSPIRONE HYDROCHLORIDE 10 MG/1
10 TABLET ORAL 3 TIMES DAILY
Qty: 90 TABLET | Refills: 1 | Status: SHIPPED | OUTPATIENT
Start: 2021-03-09 | End: 2021-05-08

## 2021-04-28 ENCOUNTER — TELEPHONE (OUTPATIENT)
Dept: GASTROENTEROLOGY | Facility: CLINIC | Age: 36
End: 2021-04-28

## 2021-05-18 ENCOUNTER — PATIENT MESSAGE (OUTPATIENT)
Dept: FAMILY MEDICINE | Facility: CLINIC | Age: 36
End: 2021-05-18

## 2021-05-18 ENCOUNTER — OFFICE VISIT (OUTPATIENT)
Dept: FAMILY MEDICINE | Facility: CLINIC | Age: 36
End: 2021-05-18
Payer: COMMERCIAL

## 2021-05-18 ENCOUNTER — TELEPHONE (OUTPATIENT)
Dept: FAMILY MEDICINE | Facility: CLINIC | Age: 36
End: 2021-05-18

## 2021-05-18 DIAGNOSIS — F41.0 PANIC DISORDER: ICD-10-CM

## 2021-05-18 DIAGNOSIS — R76.8 POSITIVE ANA (ANTINUCLEAR ANTIBODY): ICD-10-CM

## 2021-05-18 DIAGNOSIS — R07.89 CHEST DISCOMFORT: ICD-10-CM

## 2021-05-18 DIAGNOSIS — F41.9 ANXIETY: Primary | ICD-10-CM

## 2021-05-18 DIAGNOSIS — E78.5 DYSLIPIDEMIA: ICD-10-CM

## 2021-05-18 DIAGNOSIS — K21.9 GASTROESOPHAGEAL REFLUX DISEASE, UNSPECIFIED WHETHER ESOPHAGITIS PRESENT: ICD-10-CM

## 2021-05-18 PROCEDURE — 99214 OFFICE O/P EST MOD 30 MIN: CPT | Mod: 95,,, | Performed by: FAMILY MEDICINE

## 2021-05-18 PROCEDURE — 99214 PR OFFICE/OUTPT VISIT, EST, LEVL IV, 30-39 MIN: ICD-10-PCS | Mod: 95,,, | Performed by: FAMILY MEDICINE

## 2021-05-18 RX ORDER — LORAZEPAM 2 MG/1
2 TABLET ORAL 2 TIMES DAILY
Qty: 15 TABLET | Refills: 1 | Status: SHIPPED | OUTPATIENT
Start: 2021-05-18 | End: 2021-11-01

## 2021-05-18 RX ORDER — ESCITALOPRAM OXALATE 10 MG/1
10 TABLET ORAL DAILY
Qty: 30 TABLET | Refills: 2 | Status: SHIPPED | OUTPATIENT
Start: 2021-05-18 | End: 2021-08-14

## 2021-05-18 RX ORDER — ROSUVASTATIN CALCIUM 10 MG/1
10 TABLET, COATED ORAL DAILY
Qty: 90 TABLET | Refills: 3 | Status: SHIPPED | OUTPATIENT
Start: 2021-05-18 | End: 2022-02-15

## 2021-05-24 PROBLEM — Z09 HOSPITAL DISCHARGE FOLLOW-UP: Status: RESOLVED | Noted: 2021-02-17 | Resolved: 2021-05-24

## 2021-06-04 ENCOUNTER — HOSPITAL ENCOUNTER (OUTPATIENT)
Dept: CARDIOLOGY | Facility: HOSPITAL | Age: 36
Discharge: HOME | End: 2021-06-04
Attending: FAMILY MEDICINE
Payer: COMMERCIAL

## 2021-06-04 VITALS — WEIGHT: 146 LBS | BODY MASS INDEX: 27.56 KG/M2 | HEIGHT: 61 IN

## 2021-06-04 DIAGNOSIS — R07.89 CHEST DISCOMFORT: ICD-10-CM

## 2021-06-04 DIAGNOSIS — E78.5 DYSLIPIDEMIA: ICD-10-CM

## 2021-06-04 LAB
CV STRESS BASE HR: 76 BPM
DIASTOLIC BLOOD PRESSURE: 58 MMHG
OHS CV CPX 1 MINUTE RECOVERY HEART RATE: 105 BPM
OHS CV CPX 85 PERCENT MAX PREDICTED HEART RATE MALE: 148
OHS CV CPX ESTIMATED METS: 9
OHS CV CPX MAX PREDICTED HEART RATE: 174
OHS CV CPX PATIENT IS FEMALE: 1
OHS CV CPX PATIENT IS MALE: 0
OHS CV CPX PEAK DIASTOLIC BLOOD PRESSURE: 70 MMHG
OHS CV CPX PEAK HEAR RATE: 160 BPM
OHS CV CPX PEAK RATE PRESSURE PRODUCT: NORMAL
OHS CV CPX PEAK SYSTOLIC BLOOD PRESSURE: 120 MMHG
OHS CV CPX PERCENT MAX PREDICTED HEART RATE ACHIEVED: 92
OHS CV CPX RATE PRESSURE PRODUCT PRESENTING: 8892
STRESS ECHO POST EXERCISE DUR MIN: 5 MINUTES
STRESS ECHO POST EXERCISE DUR SEC: 6 SECONDS
SYSTOLIC BLOOD PRESSURE: 117 MMHG

## 2021-06-04 PROCEDURE — 93017 CV STRESS TEST TRACING ONLY: CPT | Mod: PO

## 2021-06-04 PROCEDURE — 93016 CV STRESS TEST SUPVJ ONLY: CPT | Mod: ,,, | Performed by: INTERNAL MEDICINE

## 2021-06-04 PROCEDURE — 93016 EXERCISE STRESS - EKG (CUPID ONLY): ICD-10-PCS | Mod: ,,, | Performed by: INTERNAL MEDICINE

## 2021-06-04 PROCEDURE — 93018 EXERCISE STRESS - EKG (CUPID ONLY): ICD-10-PCS | Mod: ,,, | Performed by: INTERNAL MEDICINE

## 2021-06-04 PROCEDURE — 93018 CV STRESS TEST I&R ONLY: CPT | Mod: ,,, | Performed by: INTERNAL MEDICINE

## 2021-07-27 ENCOUNTER — TELEPHONE (OUTPATIENT)
Dept: GASTROENTEROLOGY | Facility: CLINIC | Age: 36
End: 2021-07-27

## 2021-10-11 ENCOUNTER — PATIENT MESSAGE (OUTPATIENT)
Dept: GASTROENTEROLOGY | Facility: CLINIC | Age: 36
End: 2021-10-11

## 2021-10-11 DIAGNOSIS — Z01.818 PRE-OP TESTING: ICD-10-CM

## 2021-10-12 ENCOUNTER — PATIENT MESSAGE (OUTPATIENT)
Dept: GASTROENTEROLOGY | Facility: CLINIC | Age: 36
End: 2021-10-12

## 2021-10-12 ENCOUNTER — LAB VISIT (OUTPATIENT)
Dept: FAMILY MEDICINE | Facility: CLINIC | Age: 36
End: 2021-10-12
Payer: COMMERCIAL

## 2021-10-12 DIAGNOSIS — Z01.818 PRE-OP TESTING: ICD-10-CM

## 2021-10-12 PROCEDURE — U0003 INFECTIOUS AGENT DETECTION BY NUCLEIC ACID (DNA OR RNA); SEVERE ACUTE RESPIRATORY SYNDROME CORONAVIRUS 2 (SARS-COV-2) (CORONAVIRUS DISEASE [COVID-19]), AMPLIFIED PROBE TECHNIQUE, MAKING USE OF HIGH THROUGHPUT TECHNOLOGIES AS DESCRIBED BY CMS-2020-01-R: HCPCS | Performed by: INTERNAL MEDICINE

## 2021-10-12 PROCEDURE — U0005 INFEC AGEN DETEC AMPLI PROBE: HCPCS | Performed by: INTERNAL MEDICINE

## 2021-10-13 LAB
SARS-COV-2 RNA RESP QL NAA+PROBE: NOT DETECTED
SARS-COV-2- CYCLE NUMBER: NORMAL

## 2021-10-15 PROBLEM — R19.7 DIARRHEA: Status: ACTIVE | Noted: 2021-10-15

## 2022-02-15 ENCOUNTER — PATIENT MESSAGE (OUTPATIENT)
Dept: FAMILY MEDICINE | Facility: CLINIC | Age: 37
End: 2022-02-15
Payer: COMMERCIAL

## 2022-02-15 DIAGNOSIS — E78.5 DYSLIPIDEMIA: ICD-10-CM

## 2022-02-15 DIAGNOSIS — F41.9 ANXIETY: ICD-10-CM

## 2022-02-15 RX ORDER — ROSUVASTATIN CALCIUM 10 MG/1
TABLET, COATED ORAL
Qty: 90 TABLET | Refills: 0 | Status: SHIPPED | OUTPATIENT
Start: 2022-02-15

## 2022-02-15 RX ORDER — ESCITALOPRAM OXALATE 10 MG/1
TABLET ORAL
Qty: 90 TABLET | Refills: 0 | Status: SHIPPED | OUTPATIENT
Start: 2022-02-15 | End: 2023-04-21 | Stop reason: SDUPTHER

## 2022-02-15 NOTE — TELEPHONE ENCOUNTER
Care Due:                  Date            Visit Type   Department     Provider  --------------------------------------------------------------------------------                                NEW PATIENT                              - VIRTUAL    [facility]  Last Visit: 05-      (DT)         MEDICINE       Yusef Angel  Next Visit: None Scheduled  None         None Found                                                            Last  Test          Frequency    Reason                     Performed    Due Date  --------------------------------------------------------------------------------    Office Visit  12 months..  EScitalopram,              05- 05-                             esomeprazole,                             montelukast, rosuvastatin    Powered by [employer]. Reference number: [id number].   2/15/2022 8:02:42 AM CST

## 2022-02-15 NOTE — TELEPHONE ENCOUNTER
Provider Staff:     Action is required for this patient.   Please see care gap opportunities below in Care Due Message.     Thanks!  [facility]     Appointments      Date Provider   Last Visit   5/18/2021 Yusef Angel MD   Next Visit   Visit date not found Yusef Angel MD     Note composed:8:06 AM 02/15/2022

## 2022-02-15 NOTE — TELEPHONE ENCOUNTER
Refill Authorization Note   Sumi Niño  is requesting a refill authorization.  Brief Assessment and Rationale for Refill:  Approve    -Medication-Related Problems Identified: Requires appointment  Medication Therapy Plan:       Medication Reconciliation Completed: No   Comments:   --->Care Gap information included below if applicable.   Orders Placed This Encounter    EScitalopram oxalate (LEXAPRO) 10 MG tablet    rosuvastatin (CRESTOR) 10 MG tablet      Requested Prescriptions   Signed Prescriptions Disp Refills    EScitalopram oxalate (LEXAPRO) 10 MG tablet 90 tablet 0     Sig: TAKE 1 TABLET(10 MG) BY MOUTH EVERY DAY       Psychiatry:  Antidepressants - SSRI Passed - 2/15/2022  8:05 AM        Passed - Patient is at least 18 years old        Passed - Negative Pregnancy Status Check        Passed - Valid encounter within last 15 months     Recent Visits  Date Type Provider Dept   05/18/21 Office Visit Yusef Angel MD [facility] Family [facility]   03/09/21 Office Visit Alexis Guadalupe MD [facility]   02/17/21 Office Visit Alexis Guadalupe MD [facility]   11/25/20 Office Visit Alexis Guadalupe MD [facility]   10/14/20 Office Visit Alexis Guadalupe MD [facility]   Showing recent visits within past 720 days and meeting all other requirements  Future Appointments  No visits were found meeting these conditions.  Showing future appointments within next 150 days and meeting all other requirements                  rosuvastatin (CRESTOR) 10 MG tablet 90 tablet 0     Sig: TAKE 1 TABLET(10 MG) BY MOUTH EVERY DAY       Cardiovascular:  Antilipid - Statins Passed - 2/15/2022  8:00 AM        Passed - Patient is at least 18 years old        Passed - Negative Pregnancy Status Check        Passed - Valid encounter within last 15 months     Recent Visits  Date Type Provider Dept   05/18/21 Office Visit Yusef Angel MD [facility] Family Medicine   03/09/21 Office Visit Alexis Guadalupe  MD [facility] Family Medicine   02/17/21 Office Visit Alexis Guadalupe MD [facility]   11/25/20 Office Visit Alexis Guadalupe MD [facility]   10/14/20 Office Visit Alexis Guadalupe MD [facility] Medicine   Showing recent visits within past 720 days and meeting all other requirements  Future Appointments  No visits were found meeting these conditions.  Showing future appointments within next 150 days and meeting all other requirements                Passed - ALT is 131 or below and within 360 days     ALT   Date Value Ref Range Status   06/04/2021 28 10 - 44 U/L Final   02/24/2021 25 10 - 44 U/L Final   11/20/2020 54 (H) 10 - 44 U/L Final              Passed - AST is 119 or below and within 360 days     AST   Date Value Ref Range Status   06/04/2021 28 10 - 40 U/L Final   02/24/2021 21 10 - 40 U/L Final   11/20/2020 40 10 - 40 U/L Final              Passed - Total Cholesterol within 360 days     Lab Results   Component Value Date    CHOL 262 (H) 06/04/2021    CHOL 301 (H) 02/24/2021    CHOL 324 (H) 11/20/2020              Passed - LDL within 360 days     LDL Cholesterol   Date Value Ref Range Status   06/04/2021 141.8 63.0 - 159.0 mg/dL Final     Comment:     The National Cholesterol Education Program (NCEP) has set the  following guidelines (reference values) for LDL Cholesterol:  Optimal.......................<130 mg/dL  Borderline High...............130-159 mg/dL  High..........................160-189 mg/dL  Very High.....................>190 mg/dL              Passed - HDL within 360 days     HDL   Date Value Ref Range Status   06/04/2021 105 (H) 40 - 75 mg/dL Final     Comment:     The National Cholesterol Education Program (NCEP) has set the  following guidelines (reference values) for HDL Cholesterol:  Low...............<40 mg/dL  Optimal...........>60 mg/dL              Passed - Triglycerides within 360 days     Lab Results   Component Value Date    TRIG 76 06/04/2021    TRIG 103  02/24/2021    TRIG 108 11/20/2020                  Appointments  past 12m or future 3m with PCP    Date Provider   Last Visit   5/18/2021 Yusef Angel MD   Next Visit   Visit date not found Yusef Angel MD   ED visits in past 90 days: 0     Note composed:8:06 AM 02/15/2022

## 2022-05-31 ENCOUNTER — PATIENT MESSAGE (OUTPATIENT)
Dept: ADMINISTRATIVE | Facility: HOSPITAL | Age: 37
End: 2022-05-31
Payer: COMMERCIAL

## 2022-10-06 ENCOUNTER — PATIENT MESSAGE (OUTPATIENT)
Dept: FAMILY MEDICINE | Facility: CLINIC | Age: 37
End: 2022-10-06
Payer: COMMERCIAL

## 2023-03-23 ENCOUNTER — PATIENT MESSAGE (OUTPATIENT)
Dept: ADMINISTRATIVE | Facility: HOSPITAL | Age: 38
End: 2023-03-23
Payer: COMMERCIAL

## 2023-03-23 ENCOUNTER — PATIENT OUTREACH (OUTPATIENT)
Dept: ADMINISTRATIVE | Facility: HOSPITAL | Age: 38
End: 2023-03-23
Payer: COMMERCIAL

## 2023-03-23 NOTE — PROGRESS NOTES
Contacted pt about scheduling f/u appt w/ Dr. Angel's physician assistant LASHANDA Mcduffie. Portal message sent.